# Patient Record
Sex: FEMALE | Race: WHITE | Employment: PART TIME | URBAN - METROPOLITAN AREA
[De-identification: names, ages, dates, MRNs, and addresses within clinical notes are randomized per-mention and may not be internally consistent; named-entity substitution may affect disease eponyms.]

---

## 2017-07-19 ENCOUNTER — HOSPITAL ENCOUNTER (OUTPATIENT)
Dept: WOMENS IMAGING | Age: 57
Discharge: HOME | End: 2017-07-19
Payer: MEDICAID

## 2017-07-19 DIAGNOSIS — Z13.9 SCREENING: ICD-10-CM

## 2017-07-19 PROCEDURE — 77063 BREAST TOMOSYNTHESIS BI: CPT

## 2018-12-03 ENCOUNTER — HOSPITAL ENCOUNTER (OUTPATIENT)
Dept: WOMENS IMAGING | Age: 58
Discharge: HOME | End: 2018-12-05
Payer: MEDICAID

## 2018-12-03 DIAGNOSIS — Z12.39 ENCOUNTER FOR SCREENING BREAST EXAMINATION: ICD-10-CM

## 2018-12-03 PROCEDURE — G0279 TOMOSYNTHESIS, MAMMO: HCPCS

## 2021-06-23 ENCOUNTER — HOSPITAL ENCOUNTER (OUTPATIENT)
Dept: WOMENS IMAGING | Age: 61
Discharge: HOME | End: 2021-06-25
Payer: MEDICAID

## 2021-06-23 DIAGNOSIS — Z12.31 ENCOUNTER FOR SCREENING MAMMOGRAM FOR BREAST CANCER: ICD-10-CM

## 2021-06-23 DIAGNOSIS — Z12.31 SCREENING MAMMOGRAM FOR HIGH-RISK PATIENT: ICD-10-CM

## 2021-06-23 PROCEDURE — 77063 BREAST TOMOSYNTHESIS BI: CPT

## 2024-05-01 ENCOUNTER — HOSPITAL ENCOUNTER (OUTPATIENT)
Dept: RADIOLOGY | Facility: CLINIC | Age: 64
Discharge: HOME | End: 2024-05-01
Payer: MEDICAID

## 2024-05-01 ENCOUNTER — OFFICE VISIT (OUTPATIENT)
Dept: ORTHOPEDIC SURGERY | Facility: CLINIC | Age: 64
End: 2024-05-01
Payer: MEDICAID

## 2024-05-01 DIAGNOSIS — M16.12 PRIMARY OSTEOARTHRITIS OF LEFT HIP: ICD-10-CM

## 2024-05-01 DIAGNOSIS — M17.11 PRIMARY OSTEOARTHRITIS OF RIGHT KNEE: ICD-10-CM

## 2024-05-01 DIAGNOSIS — M17.11 PRIMARY OSTEOARTHRITIS OF RIGHT KNEE: Primary | ICD-10-CM

## 2024-05-01 PROCEDURE — 73564 X-RAY EXAM KNEE 4 OR MORE: CPT | Mod: RT

## 2024-05-01 PROCEDURE — 73564 X-RAY EXAM KNEE 4 OR MORE: CPT | Mod: RIGHT SIDE | Performed by: ORTHOPAEDIC SURGERY

## 2024-05-01 PROCEDURE — 1036F TOBACCO NON-USER: CPT | Performed by: ORTHOPAEDIC SURGERY

## 2024-05-01 PROCEDURE — 73502 X-RAY EXAM HIP UNI 2-3 VIEWS: CPT | Mod: LT

## 2024-05-01 PROCEDURE — 73502 X-RAY EXAM HIP UNI 2-3 VIEWS: CPT | Mod: LEFT SIDE | Performed by: ORTHOPAEDIC SURGERY

## 2024-05-01 PROCEDURE — 99214 OFFICE O/P EST MOD 30 MIN: CPT | Performed by: ORTHOPAEDIC SURGERY

## 2024-05-01 PROCEDURE — 99213 OFFICE O/P EST LOW 20 MIN: CPT | Performed by: ORTHOPAEDIC SURGERY

## 2024-05-01 RX ORDER — CETIRIZINE HYDROCHLORIDE 10 MG/1
10 TABLET ORAL DAILY
COMMUNITY
Start: 2023-06-12

## 2024-05-01 RX ORDER — LISINOPRIL 10 MG/1
10 TABLET ORAL DAILY
COMMUNITY
Start: 2024-04-27

## 2024-05-01 RX ORDER — ATORVASTATIN CALCIUM 10 MG/1
10 TABLET, FILM COATED ORAL DAILY
COMMUNITY
Start: 2024-02-27

## 2024-05-01 RX ORDER — METOPROLOL TARTRATE 50 MG/1
50 TABLET ORAL 2 TIMES DAILY
COMMUNITY
Start: 2024-04-27

## 2024-05-01 NOTE — PROGRESS NOTES
Subjective    Patient ID: Grecia    Chief Complaint:   Chief Complaint   Patient presents with    Left Hip - Osteoarthritis     Xrays today    Right Knee - Osteoarthritis     Xrays toaday     History of present illness    63-year-old female presented to clinic today for follow-up evaluation left hip osteoarthritis and right knee osteoarthritis.  She has had chronic pain in both of these joints.  These are getting worse.  She had a period where she was uninsured and could not seek treatment.  She has new insurance today and is here for evaluation and further treatment.  She had previous Euflexxa injection of the right knee the last coming in June 2023 which gave at least 6 months of relief for her.  She is having increased left groin pain as well.  Difficulty with weightbearing activity.  Her work duties include extended weightbearing activity and limited sitting as she is a .  She has mild tingling and numbness down the right lower extremity not so much on the left.  No instability reported.      Past medical , Surgical, Family and social history reviewed.      Physical exam  General: No acute distress and breathing comfortably.  Patient is pleasant and cooperative with the examination.    Extremity  Right knee is neurovascular intact.  The affected knee was examined and inspected and was tender to touch along the medial aspect.  The patient has catching/locking and occasional mechanical symptoms.  The skin was intact without breakdown or open wounds.  There was a mild Mellissa exam seen without evidence of instability in the collateral ligaments or in the anterior posterior plane.  There was a negative Lachman's test, pivot shift test, and posterior drawer sign.  There was no foot drop, numbness or tingling.  Sensation, reflexes, and pulses in the foot and ankle were present.  There was an effusion but range of motion was good and straight leg raise testing was normal.  Knee range of motion was 0 degrees of  extension to 120 degrees of flexion.  The patient had the ability to bear weight but with discomfort.  The patient's gait was antalgic secondary to the discomfort.    Left hip is neurovascular intact.  The affected hip was examined and inspected and was tender to touch along the groin and lateral bursal area.  The hip joint occasionally displayed catching, locking, and mechanical symptoms.  The skin was intact without breakdown or open wounds.  There was some mild crepitus seen with hip internal and external range of motion without evidence of instability.  Inspection of the low back showed normal curvature and integrity of the skin.  Strength and stability of the low back muscles and ligaments were within normal limits.  There was a negative straight leg raise test with no foot drop, numbness, or tingling in both lower extremities.  Sensation, reflexes, and pulses in the foot and ankle are preserved.  There was no obvious effusion.  Range of motion showed flexion and internal and external rotation were all limited with pain.  The patient had the ability to bear weight, but with discomfort.  The patient's gait was antalgic secondary to the discomfort.    Diagnostics  Please see dictated x-ray report for right knee and left hip  No results found.     Procedure  [ none]    Assessment  Severe left hip osteoarthritis  Severe right knee osteoarthritis    Treatment plan  1.  At this time we a long discussion regards to continued conservative treatment options versus surgical intervention including a left total hip replacement since her left hip seems to be the most severe the 2 joints.  2.  She wants to continue with conservative treatment as of now.  Since she has had success with viscosupplementation we will go ahead and submit for approval for Euflexxa injections right knee.  We will also get her set up for a fluoroscopy guided steroid injection in the left hip.  She will continue with weightbearing activity as  tolerated.  Oral analgesics are recommended.  3.  She will follow-up with us after we get approval for these Euflexxa injections.  For complete plan and/or surgical details, please refer to Dr. Mancia's portion of the split/shared dictation.  4.  All of the patient's questions were answered.    In conclusion, this patient has osteoarthritis of the knee which is symptomatic.  This is causing significant morning stiffness lasting over an hour.  The patient has popping clicking and grinding in the knee with range of motion that is decreased and gets worse with prolonged standing or going up and down stairs.  This affects functional activities and activities of daily living.  There is radiographic evidence of osteoarthritis with joint space narrowing and marginal osteophyte formation.  This patient has also failed nonpharmacologic treatment for osteoarthritis including attempts at weight loss, and a home exercise program with or without physical therapy.  The patient has also failed pharmacologic treatments for osteoarthritis including over-the-counter analgesics, anti-inflammatory medication as well as injectable treatments.  For these reasons I feel the patient is a candidate for Visco supplementation injections of the knee.    Orders Placed This Encounter    XR hip left with pelvis when performed 2 or 3 views    XR knee right 4+ views       This note was prepared using voice recognition software.  The details of this note are correct and have been reviewed, and corrected to the best of my ability.  Some grammatical areas may persist related to the Dragon software    Neymar Braden PA-C, [facility]  [facility] [place]    [phone]    In a face-to-face encounter performed today, I Steven Mancia MD performed a history and physical examination, discussed pertinent diagnostic studies if indicated, and discussed diagnosis and management strategies with both the patient and the midlevel  provider.  I reviewed the midlevel's note and agree with the documented findings and plan of care.  Greater than 50% of the evaluation and treatment decision was performed by the physician myself during today's visit.    63-year-old female well-known to me presents for evaluation of her right knee and left hip she is struggling with arthritis in both joints she has had conservative treatment in the past she had gel injection back in June 2023 and that seemed to help significantly with her knee she has had cortisone in her left hip which is also helped her she denies any new injury she had a gap in insurance so she was not unable to follow-up.  Symptoms are getting progressively worse she has not had any injury hip has pain with internal extra rotation flexion abduction external Tatian is positive knee has crepitus with range of motion tenderness of both the medial and lateral joint space.  X-rays are reviewed today as well.  Plan is to go ahead and set her up for a fluoro-guided steroid injection of the left hip and management for authorization for viscosupplementation in her right knee.      Patient has severe impairment related to her presenting condition.  It is significantly impairing bodily function.  We did discuss surgical and nonsurgical options.    This note was prepared using voice recognition software.  The details of this note are correct and have been reviewed, and corrected to the best of my ability.  Some grammatical areas may persist related to the Dragon software    Steven Mancia MD  Senior Attending Physician  [facility]    [phone]

## 2024-06-04 ENCOUNTER — OFFICE VISIT (OUTPATIENT)
Dept: ORTHOPEDIC SURGERY | Facility: CLINIC | Age: 64
End: 2024-06-04
Payer: MEDICAID

## 2024-06-04 DIAGNOSIS — M16.12 PRIMARY OSTEOARTHRITIS OF LEFT HIP: Primary | ICD-10-CM

## 2024-06-04 PROCEDURE — 99214 OFFICE O/P EST MOD 30 MIN: CPT | Performed by: ORTHOPAEDIC SURGERY

## 2024-06-04 PROCEDURE — 1036F TOBACCO NON-USER: CPT | Performed by: ORTHOPAEDIC SURGERY

## 2024-06-04 NOTE — PROGRESS NOTES
History of present illness    63-year-old female chronic left-sided hip pain has severe hip arthritis at the time of her last visit she was considering a cortisone injection intra-articular however when she spoke to the radiologist she was told by the radiologist that it would be 6 months before any hip replacement surgery so she opted not to proceed with the injection the hip pain is getting progressively worse she is having severe impairment of her bodily function related to her left hip arthritis.      Past medical , Surgical, Family and social history reviewed.      Physical exam  General: No acute distress and breathing comfortably.  Patient is pleasant and cooperative with the examination.    Extremity  The affected hip was examined and inspected and was tender to touch along the groin and lateral bursal area.  The hip joint occasionally displayed catching, locking, and mechanical symptoms.  The skin was intact without breakdown or open wounds.  There was some mild crepitus seen with hip internal and external range of motion without evidence of instability.  Inspection of the low back showed normal curvature and integrity of the skin.  Strength and stability of the low back muscles and ligaments were within normal limits.  There was a negative straight leg raise test with no foot drop, numbness, or tingling in both lower extremities.  Sensation, reflexes, and pulses in the foot and ankle are preserved.  There was no obvious effusion.  Range of motion showed flexion and internal and external rotation were all limited with pain.  The patient had the ability to bear weight, but with discomfort.  The patient's gait was antalgic secondary to the discomfort.    Diagnostics      No results found.     Procedure  [ none]    Assessment  Severe left hip arthritis    Treatment plan  1.  The natural history of the condition and its associated treatment alternatives including surgical and nonsurgical  options were discussed with the patient at length.  2.  Patient has failed all forms of conservative treatment.  The joint pain is significantly affecting quality of life and activities of daily living.    The patient has pain in the joint that is increased with activity and weightbearing, and walks with an antalgic gait.  The pain is interfering with activities of daily living.  Patient has limited range of motion and pain with passive range of motion.  X-rays demonstrate joint space narrowing, subchondral sclerosis and osteophyte formation.  Symptoms are not improving with medication, physical therapy or supportive device for a period of at least 3 months.  Weight loss and smoking cessation have been discussed with the patient.  Patient advised on when to cease smoking 6-8 weeks before the procedure.      Patient would like to go and schedule joint replacement surgery.  The procedure its risks, benefits, and treatment alternatives were discussed at length and patient would like to proceed.  Patient is going to schedule the procedure at their earliest convenience and see me back for a preop visit just prior.  Preadmission testing, medical checkup, and insurance authorization will be performed in the meantime.  Patient understands a joint replacement surgery is a last resort, although a very successful operation results are not guaranteed.  3.  The procedures risk benefits treatment alternatives and postoperative course were discussed with her she understands and would like to proceed with hip replacement surgery I did explain that cortisone injections really hard 3 months afterwards but she still like to go and get the hip replacement scheduled soon as possible all her question where today  4.  All of the patient's questions were answered.    No orders of the defined types were placed in this encounter.      This note was prepared using voice recognition software.  The details of this note are correct and have been  reviewed, and corrected to the best of my ability.  Some grammatical areas may persist related to the Dragon software    Steven Mancia MD  Senior Attending Physician  [facility]  Orthopedic [place]    [phone]

## 2024-07-08 ENCOUNTER — TRANSCRIBE ORDERS (OUTPATIENT)
Dept: ORTHOPEDIC SURGERY | Facility: CLINIC | Age: 64
End: 2024-07-08
Payer: MEDICAID

## 2024-07-08 DIAGNOSIS — M16.12 UNILATERAL PRIMARY OSTEOARTHRITIS, LEFT HIP: Primary | ICD-10-CM

## 2024-07-09 PROBLEM — M16.12 UNILATERAL PRIMARY OSTEOARTHRITIS, LEFT HIP: Status: ACTIVE | Noted: 2024-07-08

## 2024-07-29 ENCOUNTER — HOSPITAL ENCOUNTER (OUTPATIENT)
Dept: RADIOLOGY | Facility: HOSPITAL | Age: 64
Discharge: HOME | End: 2024-07-29
Payer: MEDICAID

## 2024-07-29 ENCOUNTER — HOSPITAL ENCOUNTER (OUTPATIENT)
Dept: CARDIOLOGY | Facility: HOSPITAL | Age: 64
Discharge: HOME | End: 2024-07-29
Payer: MEDICAID

## 2024-07-29 ENCOUNTER — PRE-ADMISSION TESTING (OUTPATIENT)
Dept: PREADMISSION TESTING | Facility: HOSPITAL | Age: 64
End: 2024-07-29
Payer: MEDICAID

## 2024-07-29 VITALS
OXYGEN SATURATION: 96 % | WEIGHT: 129 LBS | DIASTOLIC BLOOD PRESSURE: 78 MMHG | BODY MASS INDEX: 21.49 KG/M2 | HEIGHT: 65 IN | HEART RATE: 62 BPM | RESPIRATION RATE: 18 BRPM | SYSTOLIC BLOOD PRESSURE: 130 MMHG

## 2024-07-29 DIAGNOSIS — Z00.01 ENCOUNTER FOR GENERAL ADULT MEDICAL EXAMINATION WITH ABNORMAL FINDINGS: ICD-10-CM

## 2024-07-29 DIAGNOSIS — M16.12 UNILATERAL PRIMARY OSTEOARTHRITIS, LEFT HIP: Primary | ICD-10-CM

## 2024-07-29 DIAGNOSIS — M16.12 UNILATERAL PRIMARY OSTEOARTHRITIS, LEFT HIP: ICD-10-CM

## 2024-07-29 LAB
ALBUMIN SERPL BCP-MCNC: 4.1 G/DL (ref 3.4–5)
ALP SERPL-CCNC: 67 U/L (ref 33–136)
ALT SERPL W P-5'-P-CCNC: 13 U/L (ref 7–45)
ANION GAP SERPL CALC-SCNC: 9 MMOL/L (ref 10–20)
APTT PPP: 33 SECONDS (ref 27–38)
AST SERPL W P-5'-P-CCNC: 22 U/L (ref 9–39)
BASOPHILS # BLD AUTO: 0.05 X10*3/UL (ref 0–0.1)
BASOPHILS NFR BLD AUTO: 0.6 %
BILIRUB SERPL-MCNC: 0.4 MG/DL (ref 0–1.2)
BUN SERPL-MCNC: 16 MG/DL (ref 6–23)
CALCIUM SERPL-MCNC: 9.4 MG/DL (ref 8.6–10.3)
CHLORIDE SERPL-SCNC: 104 MMOL/L (ref 98–107)
CO2 SERPL-SCNC: 30 MMOL/L (ref 21–32)
CREAT SERPL-MCNC: 0.74 MG/DL (ref 0.5–1.05)
EGFRCR SERPLBLD CKD-EPI 2021: >90 ML/MIN/1.73M*2
EOSINOPHIL # BLD AUTO: 0.35 X10*3/UL (ref 0–0.7)
EOSINOPHIL NFR BLD AUTO: 4.3 %
ERYTHROCYTE [DISTWIDTH] IN BLOOD BY AUTOMATED COUNT: 14.6 % (ref 11.5–14.5)
EST. AVERAGE GLUCOSE BLD GHB EST-MCNC: 111 MG/DL
GLUCOSE SERPL-MCNC: 97 MG/DL (ref 74–99)
HBA1C MFR BLD: 5.5 %
HCT VFR BLD AUTO: 39.9 % (ref 36–46)
HGB BLD-MCNC: 13.4 G/DL (ref 12–16)
IMM GRANULOCYTES # BLD AUTO: 0.01 X10*3/UL (ref 0–0.7)
IMM GRANULOCYTES NFR BLD AUTO: 0.1 % (ref 0–0.9)
INR PPP: 0.9 (ref 0.9–1.1)
LYMPHOCYTES # BLD AUTO: 2.76 X10*3/UL (ref 1.2–4.8)
LYMPHOCYTES NFR BLD AUTO: 34.2 %
MAGNESIUM SERPL-MCNC: 1.58 MG/DL (ref 1.6–2.4)
MCH RBC QN AUTO: 32.1 PG (ref 26–34)
MCHC RBC AUTO-ENTMCNC: 33.6 G/DL (ref 32–36)
MCV RBC AUTO: 96 FL (ref 80–100)
MONOCYTES # BLD AUTO: 0.62 X10*3/UL (ref 0.1–1)
MONOCYTES NFR BLD AUTO: 7.7 %
NEUTROPHILS # BLD AUTO: 4.29 X10*3/UL (ref 1.2–7.7)
NEUTROPHILS NFR BLD AUTO: 53.1 %
NRBC BLD-RTO: 0 /100 WBCS (ref 0–0)
PLATELET # BLD AUTO: 250 X10*3/UL (ref 150–450)
POTASSIUM SERPL-SCNC: 4.3 MMOL/L (ref 3.5–5.3)
PROT SERPL-MCNC: 6.5 G/DL (ref 6.4–8.2)
PROTHROMBIN TIME: 10.1 SECONDS (ref 9.8–12.8)
RBC # BLD AUTO: 4.18 X10*6/UL (ref 4–5.2)
SODIUM SERPL-SCNC: 139 MMOL/L (ref 136–145)
WBC # BLD AUTO: 8.1 X10*3/UL (ref 4.4–11.3)

## 2024-07-29 PROCEDURE — 73700 CT LOWER EXTREMITY W/O DYE: CPT | Mod: LT

## 2024-07-29 PROCEDURE — 71046 X-RAY EXAM CHEST 2 VIEWS: CPT | Performed by: RADIOLOGY

## 2024-07-29 PROCEDURE — 93010 ELECTROCARDIOGRAM REPORT: CPT | Performed by: INTERNAL MEDICINE

## 2024-07-29 PROCEDURE — 85025 COMPLETE CBC W/AUTO DIFF WBC: CPT

## 2024-07-29 PROCEDURE — 83036 HEMOGLOBIN GLYCOSYLATED A1C: CPT | Mod: ELYLAB

## 2024-07-29 PROCEDURE — 87081 CULTURE SCREEN ONLY: CPT | Mod: ELYLAB

## 2024-07-29 PROCEDURE — 84075 ASSAY ALKALINE PHOSPHATASE: CPT

## 2024-07-29 PROCEDURE — 71046 X-RAY EXAM CHEST 2 VIEWS: CPT

## 2024-07-29 PROCEDURE — 36415 COLL VENOUS BLD VENIPUNCTURE: CPT

## 2024-07-29 PROCEDURE — 93005 ELECTROCARDIOGRAM TRACING: CPT

## 2024-07-29 PROCEDURE — 85730 THROMBOPLASTIN TIME PARTIAL: CPT

## 2024-07-29 PROCEDURE — 85610 PROTHROMBIN TIME: CPT

## 2024-07-29 PROCEDURE — 83735 ASSAY OF MAGNESIUM: CPT

## 2024-07-29 RX ORDER — BISMUTH SUBSALICYLATE 262 MG
1 TABLET,CHEWABLE ORAL DAILY
COMMUNITY

## 2024-07-29 NOTE — PREPROCEDURE INSTRUCTIONS
Patient and nurse discussed pre-operative instructions of the location of procedure, NPO status, home medications, and what to aspect after procedure.  Patient is aware to not smoke nicotine products, drink alcohol, or do any drugs within 24hrs of procedure.  Not to bring any valuables and to not wear any metal, jewelry, piercing's or beauty products for surgery.  Patient received the  instructional handout on how to perform the CHG wash and oral rinse the night before and the morning of surgery.  Informed about the call the business day prior to procedure that will be give the exact time of arrival on the day of surgery, between 2PM-4PM.  Any questions call the surgeons office, and any questions about Pre-Admission Testing call [phone]

## 2024-07-31 LAB
ATRIAL RATE: 51 BPM
P AXIS: 49 DEGREES
P OFFSET: 200 MS
P ONSET: 151 MS
PR INTERVAL: 140 MS
Q ONSET: 221 MS
QRS COUNT: 9 BEATS
QRS DURATION: 78 MS
QT INTERVAL: 388 MS
QTC CALCULATION(BAZETT): 357 MS
QTC FREDERICIA: 367 MS
R AXIS: 63 DEGREES
STAPHYLOCOCCUS SPEC CULT: NORMAL
T AXIS: 70 DEGREES
T OFFSET: 415 MS
VENTRICULAR RATE: 51 BPM

## 2024-08-02 NOTE — PROGRESS NOTES
Physical Therapy  Physical Therapy Pre-Op Evaluation    Patient Name: Grecia Stephenson  MRN: [id number]  Today's Date: 8/6/2024  Time Calculation  Start Time: 0246  Stop Time: 0306  Time Calculation (min): 20 min  PT Evaluation Time Entry  PT Evaluation (Low) Time Entry: 20                    Insurance:  [employer] [id number] THEN AUTH REQ  COPAY 0 DED 0 COVERAGE 100 OOP 0   1 of 30    General:  Reason for visit: L ZINA Pre-op 8/12/24      Precautions: Per MR       Medical History Form: Reviewed (scanned into chart)    Subjective:   63-year-old female chronic left-sided hip pain has severe hip arthritis at the time of her last visit she was considering a cortisone injection intra-articular however when she spoke to the radiologist she was told by the radiologist that it would be 6 months before any hip replacement surgery so she opted not to proceed with the injection the hip pain is getting progressively worse she is having severe impairment of her bodily function related to her left hip arthritis.   -Encounter note 6/4/24    Chief Complaint: Patient presents to clinic L hip pain      Current Condition:   Same    Pain:         Patients Living Environment: Reviewed and no concern      Red Flags: Do you have any of the following? No  Fever/chills, unexplained weight changes, dizziness/fainting, unexplained change in bowel or bladder functions, unexplained malaise or muscle weakness, night pain/sweats, numbness or tingling    Objective:    L HIP     Hip AROM  F 100  E 5  AB 20  AD 0  IR 5  ER 5          L hipMMT 0/5   F 3+  E 4-  AB 3+  AD 4  IR 3  ER 3              Posture: Flexed trunk w/ listing    Lower Extremity Functional Movements  Transfers: Ind  Gait: Ind -w/ antalgic pattern      Outcome Measures: to be taken at Re-Eval    EDUCATION: Pt educated in Post-operative Exs, surgery risks, proper gait w/ device (if applicable), signs of infection, when to call doctor, precautions and issued hand  booklet.    Goals: To be set at Re-Eval based on physical and functional status      Assessment:  Pt understands post-operative materials.    Plan:  Have surgery and follow up with OP PT when appropriate    Giorgio Goodwin, PT, PT

## 2024-08-06 ENCOUNTER — EVALUATION (OUTPATIENT)
Dept: PHYSICAL THERAPY | Facility: CLINIC | Age: 64
End: 2024-08-06
Payer: MEDICAID

## 2024-08-06 ENCOUNTER — OFFICE VISIT (OUTPATIENT)
Dept: ORTHOPEDIC SURGERY | Facility: CLINIC | Age: 64
End: 2024-08-06
Payer: MEDICAID

## 2024-08-06 DIAGNOSIS — M16.12 PRIMARY OSTEOARTHRITIS OF LEFT HIP: Primary | ICD-10-CM

## 2024-08-06 DIAGNOSIS — M25.552 PAIN OF LEFT HIP: Primary | ICD-10-CM

## 2024-08-06 PROCEDURE — 97161 PT EVAL LOW COMPLEX 20 MIN: CPT | Mod: GP | Performed by: PHYSICAL THERAPIST

## 2024-08-06 PROCEDURE — 99211 OFF/OP EST MAY X REQ PHY/QHP: CPT | Performed by: ORTHOPAEDIC SURGERY

## 2024-08-06 NOTE — PROGRESS NOTES
Subjective    Patient ID: Grecia    Chief Complaint:   Chief Complaint   Patient presents with    Left Hip - Pre-op Exam     ZINA 8/12/2024     This patient has pain in the hip that is increased with activity and weightbearing, the patient walks with an antalgic gait at this time.  The pain is interfering with activities of daily living.  The patient has limited range of motion of the hip and pain with passive range of motion.  This x-ray demonstrates joint space narrowing, subchondral sclerosis, and osteophyte formation.  The patient has failed to respond to conservative treatment with medication therapy and supportive devices for period of at least 3 months.     This is the preop visit to discuss the risks and benefits of the total hip replacement surgery.  These risks were fully explained to the patient.  With this, and as any surgery, infection is a risk.  The risk for infection is usually between 1 and 2%.  This risk is even higher in diabetics, persons with rheumatoid arthritis, patients with previous surgery, patients on oral steroid medication, and obesity.  All of these issues were properly discussed.  We always assure all sterile techniques will be followed during the procedure.  Patient is also need to be on antibiotics for the next 2 years for any minor surgical procedure, even dental work.  For high risk patients this will be for lifetime.  Severe infections may require removal of the prosthesis.     It was also explained to the patient that there will be some blood loss during the procedure.  Transfusions although rare will only be given when absolutely medically necessary.     Pulmonary embolism and blood clots were also discussed with the patient.  We discussed that these can be fatal complications of the procedure.  Is explained to the patient that the use of thromboembolic stockings, foot pumps, incentive spirometer, early mobility, and the use of blood thinners for a period of time is the  standard of care.     Dislocations are discussed with the patient.  It is explained that the highest risk for dislocating the hip happens during the first 3 months after surgery.  These risks tend to decrease with time.  This risk is higher and revisions.  It is explained to the patient that hip precautions are very important and that these will be carried out throughout the lifetime with her prosthesis.  Intraoperatively, we will adjust the length of the leg to tighten the joint to help prevent dislocation.  This leg lengthening to stabilize the joint is usually no more than 1/4 inch but may be more or less to improve stability.     Loosening and wear of the prosthesis is also discussed.  The prosthesis normally lasts between 12 and 15 years on the average.  Revisions may be more complicated.     Fractures, though rare, they also occur intraoperatively.  These fractures may occur in the pelvis or the femur.  There may be nerve or arterial injuries as well and these are discussed in detail.  Lastly the benefits of spinal anesthesia were explained to the patient.     All of the patient's questions and concerns were answered in detail.     This note was prepared using voice recognition software.  The details of this note are correct and have been reviewed, and corrected to the best of my ability.  Some grammatical areas may persist related to the Dragon software     Neymar Braden PA-C     [phone]

## 2024-08-06 NOTE — H&P (VIEW-ONLY)
Subjective    Patient ID: Grecia    Chief Complaint:   Chief Complaint   Patient presents with    Left Hip - Pre-op Exam     ZINA 8/12/2024     This patient has pain in the hip that is increased with activity and weightbearing, the patient walks with an antalgic gait at this time.  The pain is interfering with activities of daily living.  The patient has limited range of motion of the hip and pain with passive range of motion.  This x-ray demonstrates joint space narrowing, subchondral sclerosis, and osteophyte formation.  The patient has failed to respond to conservative treatment with medication therapy and supportive devices for period of at least 3 months.     This is the preop visit to discuss the risks and benefits of the total hip replacement surgery.  These risks were fully explained to the patient.  With this, and as any surgery, infection is a risk.  The risk for infection is usually between 1 and 2%.  This risk is even higher in diabetics, persons with rheumatoid arthritis, patients with previous surgery, patients on oral steroid medication, and obesity.  All of these issues were properly discussed.  We always assure all sterile techniques will be followed during the procedure.  Patient is also need to be on antibiotics for the next 2 years for any minor surgical procedure, even dental work.  For high risk patients this will be for lifetime.  Severe infections may require removal of the prosthesis.     It was also explained to the patient that there will be some blood loss during the procedure.  Transfusions although rare will only be given when absolutely medically necessary.     Pulmonary embolism and blood clots were also discussed with the patient.  We discussed that these can be fatal complications of the procedure.  Is explained to the patient that the use of thromboembolic stockings, foot pumps, incentive spirometer, early mobility, and the use of blood thinners for a period of time is the  standard of care.     Dislocations are discussed with the patient.  It is explained that the highest risk for dislocating the hip happens during the first 3 months after surgery.  These risks tend to decrease with time.  This risk is higher and revisions.  It is explained to the patient that hip precautions are very important and that these will be carried out throughout the lifetime with her prosthesis.  Intraoperatively, we will adjust the length of the leg to tighten the joint to help prevent dislocation.  This leg lengthening to stabilize the joint is usually no more than 1/4 inch but may be more or less to improve stability.     Loosening and wear of the prosthesis is also discussed.  The prosthesis normally lasts between 12 and 15 years on the average.  Revisions may be more complicated.     Fractures, though rare, they also occur intraoperatively.  These fractures may occur in the pelvis or the femur.  There may be nerve or arterial injuries as well and these are discussed in detail.  Lastly the benefits of spinal anesthesia were explained to the patient.     All of the patient's questions and concerns were answered in detail.     This note was prepared using voice recognition software.  The details of this note are correct and have been reviewed, and corrected to the best of my ability.  Some grammatical areas may persist related to the Dragon software     Neymar Braden PA-C     [phone]

## 2024-08-09 DIAGNOSIS — Z01.818 PRE-OPERATIVE CLEARANCE: Primary | ICD-10-CM

## 2024-08-09 PROCEDURE — RXMED WILLOW AMBULATORY MEDICATION CHARGE

## 2024-08-09 RX ORDER — CHLORHEXIDINE GLUCONATE ORAL RINSE 1.2 MG/ML
15 SOLUTION DENTAL DAILY
Qty: 30 ML | Refills: 0 | Status: SHIPPED | OUTPATIENT
Start: 2024-08-09 | End: 2024-08-11

## 2024-08-12 ENCOUNTER — ANESTHESIA EVENT (OUTPATIENT)
Dept: OPERATING ROOM | Facility: HOSPITAL | Age: 64
End: 2024-08-12
Payer: MEDICAID

## 2024-08-12 ENCOUNTER — HOSPITAL ENCOUNTER (OUTPATIENT)
Facility: HOSPITAL | Age: 64
Discharge: HOME | End: 2024-08-13
Attending: ORTHOPAEDIC SURGERY | Admitting: ORTHOPAEDIC SURGERY
Payer: MEDICAID

## 2024-08-12 ENCOUNTER — APPOINTMENT (OUTPATIENT)
Dept: RADIOLOGY | Facility: HOSPITAL | Age: 64
End: 2024-08-12
Payer: MEDICAID

## 2024-08-12 ENCOUNTER — ANESTHESIA (OUTPATIENT)
Dept: OPERATING ROOM | Facility: HOSPITAL | Age: 64
End: 2024-08-12
Payer: MEDICAID

## 2024-08-12 ENCOUNTER — HOME HEALTH ADMISSION (OUTPATIENT)
Dept: HOME HEALTH SERVICES | Facility: HOME HEALTH | Age: 64
End: 2024-08-12
Payer: MEDICAID

## 2024-08-12 DIAGNOSIS — M16.12 UNILATERAL PRIMARY OSTEOARTHRITIS, LEFT HIP: Primary | ICD-10-CM

## 2024-08-12 DIAGNOSIS — Z96.642 STATUS POST LEFT HIP REPLACEMENT: ICD-10-CM

## 2024-08-12 PROCEDURE — 97110 THERAPEUTIC EXERCISES: CPT | Mod: GP | Performed by: PHYSICAL THERAPIST

## 2024-08-12 PROCEDURE — 20985 CPTR-ASST DIR MS PX: CPT | Performed by: ORTHOPAEDIC SURGERY

## 2024-08-12 PROCEDURE — 97161 PT EVAL LOW COMPLEX 20 MIN: CPT | Mod: GP | Performed by: PHYSICAL THERAPIST

## 2024-08-12 PROCEDURE — 3700000001 HC GENERAL ANESTHESIA TIME - INITIAL BASE CHARGE: Performed by: ORTHOPAEDIC SURGERY

## 2024-08-12 PROCEDURE — 27130 TOTAL HIP ARTHROPLASTY: CPT | Performed by: ORTHOPAEDIC SURGERY

## 2024-08-12 PROCEDURE — C1776 JOINT DEVICE (IMPLANTABLE): HCPCS | Performed by: ORTHOPAEDIC SURGERY

## 2024-08-12 PROCEDURE — 2500000004 HC RX 250 GENERAL PHARMACY W/ HCPCS (ALT 636 FOR OP/ED): Performed by: ORTHOPAEDIC SURGERY

## 2024-08-12 PROCEDURE — 2500000002 HC RX 250 W HCPCS SELF ADMINISTERED DRUGS (ALT 637 FOR MEDICARE OP, ALT 636 FOR OP/ED): Performed by: ORTHOPAEDIC SURGERY

## 2024-08-12 PROCEDURE — 2500000005 HC RX 250 GENERAL PHARMACY W/O HCPCS: Performed by: ORTHOPAEDIC SURGERY

## 2024-08-12 PROCEDURE — 2720000007 HC OR 272 NO HCPCS: Performed by: ORTHOPAEDIC SURGERY

## 2024-08-12 PROCEDURE — 73501 X-RAY EXAM HIP UNI 1 VIEW: CPT | Mod: LEFT SIDE | Performed by: RADIOLOGY

## 2024-08-12 PROCEDURE — 2780000003 HC OR 278 NO HCPCS: Performed by: ORTHOPAEDIC SURGERY

## 2024-08-12 PROCEDURE — 7100000001 HC RECOVERY ROOM TIME - INITIAL BASE CHARGE: Performed by: ORTHOPAEDIC SURGERY

## 2024-08-12 PROCEDURE — 7100000002 HC RECOVERY ROOM TIME - EACH INCREMENTAL 1 MINUTE: Performed by: ORTHOPAEDIC SURGERY

## 2024-08-12 PROCEDURE — 73501 X-RAY EXAM HIP UNI 1 VIEW: CPT | Mod: LT

## 2024-08-12 PROCEDURE — C1713 ANCHOR/SCREW BN/BN,TIS/BN: HCPCS | Performed by: ORTHOPAEDIC SURGERY

## 2024-08-12 PROCEDURE — 3700000002 HC GENERAL ANESTHESIA TIME - EACH INCREMENTAL 1 MINUTE: Performed by: ORTHOPAEDIC SURGERY

## 2024-08-12 PROCEDURE — 7100000011 HC EXTENDED STAY RECOVERY HOURLY - NURSING UNIT

## 2024-08-12 PROCEDURE — 3600000018 HC OR TIME - INITIAL BASE CHARGE - PROCEDURE LEVEL SIX: Performed by: ORTHOPAEDIC SURGERY

## 2024-08-12 PROCEDURE — 2500000001 HC RX 250 WO HCPCS SELF ADMINISTERED DRUGS (ALT 637 FOR MEDICARE OP): Performed by: NURSE PRACTITIONER

## 2024-08-12 PROCEDURE — 2500000001 HC RX 250 WO HCPCS SELF ADMINISTERED DRUGS (ALT 637 FOR MEDICARE OP): Performed by: ORTHOPAEDIC SURGERY

## 2024-08-12 PROCEDURE — 2500000004 HC RX 250 GENERAL PHARMACY W/ HCPCS (ALT 636 FOR OP/ED): Performed by: NURSE ANESTHETIST, CERTIFIED REGISTERED

## 2024-08-12 PROCEDURE — 3600000017 HC OR TIME - EACH INCREMENTAL 1 MINUTE - PROCEDURE LEVEL SIX: Performed by: ORTHOPAEDIC SURGERY

## 2024-08-12 PROCEDURE — 2500000004 HC RX 250 GENERAL PHARMACY W/ HCPCS (ALT 636 FOR OP/ED): Performed by: ANESTHESIOLOGY

## 2024-08-12 PROCEDURE — 2500000005 HC RX 250 GENERAL PHARMACY W/O HCPCS: Performed by: NURSE ANESTHETIST, CERTIFIED REGISTERED

## 2024-08-12 PROCEDURE — A6213 FOAM DRG >16<=48 SQ IN W/BDR: HCPCS | Performed by: ORTHOPAEDIC SURGERY

## 2024-08-12 DEVICE — INSERT, TRIDENT X3 POLYETHYLENE, 10 DEG, 32MM D: Type: IMPLANTABLE DEVICE | Site: HIP | Status: FUNCTIONAL

## 2024-08-12 DEVICE — TRIDENT II CLUSTERHOLE HA ACETABULAR SHELL 48D
Type: IMPLANTABLE DEVICE | Site: HIP | Status: FUNCTIONAL
Brand: TRIDENT II

## 2024-08-12 DEVICE — 127 DEGREE NECK ANGLE HIP STEM
Type: IMPLANTABLE DEVICE | Site: HIP | Status: FUNCTIONAL
Brand: ACCOLADE

## 2024-08-12 DEVICE — CERAMIC V40 FEMORAL HEAD
Type: IMPLANTABLE DEVICE | Site: HIP | Status: FUNCTIONAL
Brand: BIOLOX

## 2024-08-12 RX ORDER — BISACODYL 5 MG
10 TABLET, DELAYED RELEASE (ENTERIC COATED) ORAL DAILY PRN
Status: DISCONTINUED | OUTPATIENT
Start: 2024-08-12 | End: 2024-08-13 | Stop reason: HOSPADM

## 2024-08-12 RX ORDER — KETOROLAC TROMETHAMINE 30 MG/ML
15 INJECTION, SOLUTION INTRAMUSCULAR; INTRAVENOUS EVERY 6 HOURS
Status: COMPLETED | OUTPATIENT
Start: 2024-08-12 | End: 2024-08-13

## 2024-08-12 RX ORDER — CETIRIZINE HYDROCHLORIDE 10 MG/1
10 TABLET ORAL DAILY
Status: DISCONTINUED | OUTPATIENT
Start: 2024-08-12 | End: 2024-08-13 | Stop reason: HOSPADM

## 2024-08-12 RX ORDER — MORPHINE SULFATE 2 MG/ML
2 INJECTION, SOLUTION INTRAMUSCULAR; INTRAVENOUS EVERY 2 HOUR PRN
Status: DISCONTINUED | OUTPATIENT
Start: 2024-08-12 | End: 2024-08-13 | Stop reason: HOSPADM

## 2024-08-12 RX ORDER — SODIUM CHLORIDE, SODIUM LACTATE, POTASSIUM CHLORIDE, CALCIUM CHLORIDE 600; 310; 30; 20 MG/100ML; MG/100ML; MG/100ML; MG/100ML
100 INJECTION, SOLUTION INTRAVENOUS CONTINUOUS
Status: DISCONTINUED | OUTPATIENT
Start: 2024-08-12 | End: 2024-08-12 | Stop reason: HOSPADM

## 2024-08-12 RX ORDER — ACETAMINOPHEN 325 MG/1
650 TABLET ORAL EVERY 6 HOURS SCHEDULED
Status: DISCONTINUED | OUTPATIENT
Start: 2024-08-12 | End: 2024-08-13 | Stop reason: HOSPADM

## 2024-08-12 RX ORDER — ATORVASTATIN CALCIUM 10 MG/1
10 TABLET, FILM COATED ORAL DAILY
Status: DISCONTINUED | OUTPATIENT
Start: 2024-08-12 | End: 2024-08-13 | Stop reason: HOSPADM

## 2024-08-12 RX ORDER — MEPERIDINE HYDROCHLORIDE 25 MG/ML
12.5 INJECTION INTRAMUSCULAR; INTRAVENOUS; SUBCUTANEOUS EVERY 10 MIN PRN
Status: DISCONTINUED | OUTPATIENT
Start: 2024-08-12 | End: 2024-08-12 | Stop reason: HOSPADM

## 2024-08-12 RX ORDER — ONDANSETRON HYDROCHLORIDE 2 MG/ML
INJECTION, SOLUTION INTRAVENOUS AS NEEDED
Status: DISCONTINUED | OUTPATIENT
Start: 2024-08-12 | End: 2024-08-12

## 2024-08-12 RX ORDER — TRANEXAMIC ACID 650 MG/1
1950 TABLET ORAL ONCE
Status: COMPLETED | OUTPATIENT
Start: 2024-08-13 | End: 2024-08-13

## 2024-08-12 RX ORDER — FENTANYL CITRATE 50 UG/ML
25 INJECTION, SOLUTION INTRAMUSCULAR; INTRAVENOUS EVERY 5 MIN PRN
Status: DISCONTINUED | OUTPATIENT
Start: 2024-08-12 | End: 2024-08-12 | Stop reason: HOSPADM

## 2024-08-12 RX ORDER — GABAPENTIN 300 MG/1
600 CAPSULE ORAL ONCE
Status: COMPLETED | OUTPATIENT
Start: 2024-08-12 | End: 2024-08-12

## 2024-08-12 RX ORDER — ONDANSETRON HYDROCHLORIDE 2 MG/ML
4 INJECTION, SOLUTION INTRAVENOUS EVERY 8 HOURS PRN
Status: DISCONTINUED | OUTPATIENT
Start: 2024-08-12 | End: 2024-08-13 | Stop reason: HOSPADM

## 2024-08-12 RX ORDER — PHENYLEPHRINE HCL IN 0.9% NACL 1 MG/10 ML
SYRINGE (ML) INTRAVENOUS AS NEEDED
Status: DISCONTINUED | OUTPATIENT
Start: 2024-08-12 | End: 2024-08-12

## 2024-08-12 RX ORDER — OXYCODONE HYDROCHLORIDE 5 MG/1
5 TABLET ORAL EVERY 4 HOURS PRN
Status: DISCONTINUED | OUTPATIENT
Start: 2024-08-12 | End: 2024-08-13 | Stop reason: HOSPADM

## 2024-08-12 RX ORDER — SODIUM CHLORIDE, SODIUM LACTATE, POTASSIUM CHLORIDE, CALCIUM CHLORIDE 600; 310; 30; 20 MG/100ML; MG/100ML; MG/100ML; MG/100ML
50 INJECTION, SOLUTION INTRAVENOUS CONTINUOUS
Status: DISCONTINUED | OUTPATIENT
Start: 2024-08-12 | End: 2024-08-13 | Stop reason: HOSPADM

## 2024-08-12 RX ORDER — ONDANSETRON 4 MG/1
4 TABLET, ORALLY DISINTEGRATING ORAL EVERY 8 HOURS PRN
Status: DISCONTINUED | OUTPATIENT
Start: 2024-08-12 | End: 2024-08-13 | Stop reason: HOSPADM

## 2024-08-12 RX ORDER — PROPOFOL 10 MG/ML
INJECTION, EMULSION INTRAVENOUS CONTINUOUS PRN
Status: DISCONTINUED | OUTPATIENT
Start: 2024-08-12 | End: 2024-08-12

## 2024-08-12 RX ORDER — SODIUM CHLORIDE 0.9 G/100ML
IRRIGANT IRRIGATION AS NEEDED
Status: DISCONTINUED | OUTPATIENT
Start: 2024-08-12 | End: 2024-08-12 | Stop reason: HOSPADM

## 2024-08-12 RX ORDER — CEFAZOLIN SODIUM 2 G/100ML
2 INJECTION, SOLUTION INTRAVENOUS EVERY 8 HOURS
Status: COMPLETED | OUTPATIENT
Start: 2024-08-12 | End: 2024-08-13

## 2024-08-12 RX ORDER — ROPIVACAINE/EPI/CLONIDINE/KET 2.46-0.005
SYRINGE (ML) INJECTION AS NEEDED
Status: DISCONTINUED | OUTPATIENT
Start: 2024-08-12 | End: 2024-08-12 | Stop reason: HOSPADM

## 2024-08-12 RX ORDER — SODIUM CHLORIDE, SODIUM LACTATE, POTASSIUM CHLORIDE, CALCIUM CHLORIDE 600; 310; 30; 20 MG/100ML; MG/100ML; MG/100ML; MG/100ML
50 INJECTION, SOLUTION INTRAVENOUS CONTINUOUS
Status: ACTIVE | OUTPATIENT
Start: 2024-08-12 | End: 2024-08-13

## 2024-08-12 RX ORDER — ACETAMINOPHEN 325 MG/1
975 TABLET ORAL ONCE
Status: COMPLETED | OUTPATIENT
Start: 2024-08-12 | End: 2024-08-12

## 2024-08-12 RX ORDER — DIPHENHYDRAMINE HCL 25 MG
25 TABLET ORAL EVERY 6 HOURS PRN
Status: DISCONTINUED | OUTPATIENT
Start: 2024-08-12 | End: 2024-08-13 | Stop reason: HOSPADM

## 2024-08-12 RX ORDER — ASPIRIN 81 MG/1
81 TABLET ORAL 2 TIMES DAILY
Status: DISCONTINUED | OUTPATIENT
Start: 2024-08-12 | End: 2024-08-13 | Stop reason: HOSPADM

## 2024-08-12 RX ORDER — OXYCODONE HYDROCHLORIDE 5 MG/1
10 TABLET ORAL EVERY 4 HOURS PRN
Status: DISCONTINUED | OUTPATIENT
Start: 2024-08-12 | End: 2024-08-13 | Stop reason: HOSPADM

## 2024-08-12 RX ORDER — LIDOCAINE HYDROCHLORIDE 20 MG/ML
INJECTION, SOLUTION EPIDURAL; INFILTRATION; INTRACAUDAL; PERINEURAL AS NEEDED
Status: DISCONTINUED | OUTPATIENT
Start: 2024-08-12 | End: 2024-08-12

## 2024-08-12 RX ORDER — MIDAZOLAM HYDROCHLORIDE 1 MG/ML
INJECTION, SOLUTION INTRAMUSCULAR; INTRAVENOUS AS NEEDED
Status: DISCONTINUED | OUTPATIENT
Start: 2024-08-12 | End: 2024-08-12

## 2024-08-12 RX ORDER — CEFAZOLIN SODIUM 2 G/100ML
2 INJECTION, SOLUTION INTRAVENOUS ONCE
Status: COMPLETED | OUTPATIENT
Start: 2024-08-12 | End: 2024-08-12

## 2024-08-12 RX ORDER — CYCLOBENZAPRINE HCL 10 MG
10 TABLET ORAL 3 TIMES DAILY PRN
Status: DISCONTINUED | OUTPATIENT
Start: 2024-08-12 | End: 2024-08-13 | Stop reason: HOSPADM

## 2024-08-12 RX ORDER — PROCHLORPERAZINE MALEATE 5 MG
10 TABLET ORAL EVERY 6 HOURS PRN
Status: DISCONTINUED | OUTPATIENT
Start: 2024-08-12 | End: 2024-08-13 | Stop reason: HOSPADM

## 2024-08-12 RX ORDER — DOCUSATE SODIUM 100 MG/1
100 CAPSULE, LIQUID FILLED ORAL 2 TIMES DAILY
Status: DISCONTINUED | OUTPATIENT
Start: 2024-08-12 | End: 2024-08-13 | Stop reason: HOSPADM

## 2024-08-12 RX ORDER — CELECOXIB 200 MG/1
200 CAPSULE ORAL ONCE
Status: COMPLETED | OUTPATIENT
Start: 2024-08-12 | End: 2024-08-12

## 2024-08-12 RX ORDER — WATER 1 ML/ML
IRRIGANT IRRIGATION AS NEEDED
Status: DISCONTINUED | OUTPATIENT
Start: 2024-08-12 | End: 2024-08-12 | Stop reason: HOSPADM

## 2024-08-12 RX ORDER — PROCHLORPERAZINE EDISYLATE 5 MG/ML
10 INJECTION INTRAMUSCULAR; INTRAVENOUS EVERY 6 HOURS PRN
Status: DISCONTINUED | OUTPATIENT
Start: 2024-08-12 | End: 2024-08-13 | Stop reason: HOSPADM

## 2024-08-12 RX ORDER — BUPIVACAINE HYDROCHLORIDE 7.5 MG/ML
INJECTION, SOLUTION INTRASPINAL AS NEEDED
Status: DISCONTINUED | OUTPATIENT
Start: 2024-08-12 | End: 2024-08-12

## 2024-08-12 RX ORDER — PROCHLORPERAZINE 25 MG/1
25 SUPPOSITORY RECTAL EVERY 12 HOURS PRN
Status: DISCONTINUED | OUTPATIENT
Start: 2024-08-12 | End: 2024-08-13 | Stop reason: HOSPADM

## 2024-08-12 RX ORDER — TRANEXAMIC ACID 650 MG/1
1950 TABLET ORAL ONCE
Status: COMPLETED | OUTPATIENT
Start: 2024-08-12 | End: 2024-08-12

## 2024-08-12 RX ORDER — FENTANYL CITRATE 50 UG/ML
50 INJECTION, SOLUTION INTRAMUSCULAR; INTRAVENOUS EVERY 5 MIN PRN
Status: DISCONTINUED | OUTPATIENT
Start: 2024-08-12 | End: 2024-08-12 | Stop reason: HOSPADM

## 2024-08-12 RX ORDER — METOPROLOL TARTRATE 50 MG/1
50 TABLET ORAL 2 TIMES DAILY
Status: DISCONTINUED | OUTPATIENT
Start: 2024-08-12 | End: 2024-08-13 | Stop reason: HOSPADM

## 2024-08-12 SDOH — SOCIAL STABILITY: SOCIAL INSECURITY: HAS ANYONE EVER THREATENED TO HURT YOUR FAMILY OR YOUR PETS?: NO

## 2024-08-12 SDOH — SOCIAL STABILITY: SOCIAL INSECURITY: ARE THERE ANY APPARENT SIGNS OF INJURIES/BEHAVIORS THAT COULD BE RELATED TO ABUSE/NEGLECT?: NO

## 2024-08-12 SDOH — SOCIAL STABILITY: SOCIAL INSECURITY: ABUSE: ADULT

## 2024-08-12 SDOH — SOCIAL STABILITY: SOCIAL INSECURITY: DOES ANYONE TRY TO KEEP YOU FROM HAVING/CONTACTING OTHER FRIENDS OR DOING THINGS OUTSIDE YOUR HOME?: NO

## 2024-08-12 SDOH — SOCIAL STABILITY: SOCIAL INSECURITY: ARE YOU OR HAVE YOU BEEN THREATENED OR ABUSED PHYSICALLY, EMOTIONALLY, OR SEXUALLY BY ANYONE?: NO

## 2024-08-12 SDOH — SOCIAL STABILITY: SOCIAL INSECURITY: DO YOU FEEL ANYONE HAS EXPLOITED OR TAKEN ADVANTAGE OF YOU FINANCIALLY OR OF YOUR PERSONAL PROPERTY?: NO

## 2024-08-12 SDOH — SOCIAL STABILITY: SOCIAL INSECURITY: HAVE YOU HAD THOUGHTS OF HARMING ANYONE ELSE?: NO

## 2024-08-12 SDOH — SOCIAL STABILITY: SOCIAL INSECURITY: HAVE YOU HAD ANY THOUGHTS OF HARMING ANYONE ELSE?: NO

## 2024-08-12 SDOH — SOCIAL STABILITY: SOCIAL INSECURITY: WERE YOU ABLE TO COMPLETE ALL THE BEHAVIORAL HEALTH SCREENINGS?: YES

## 2024-08-12 SDOH — SOCIAL STABILITY: SOCIAL INSECURITY: DO YOU FEEL UNSAFE GOING BACK TO THE PLACE WHERE YOU ARE LIVING?: NO

## 2024-08-12 ASSESSMENT — PAIN - FUNCTIONAL ASSESSMENT
PAIN_FUNCTIONAL_ASSESSMENT: 0-10
PAIN_FUNCTIONAL_ASSESSMENT: VAS (VISUAL ANALOG SCALE)
PAIN_FUNCTIONAL_ASSESSMENT: 0-10
PAIN_FUNCTIONAL_ASSESSMENT: 0-10

## 2024-08-12 ASSESSMENT — COGNITIVE AND FUNCTIONAL STATUS - GENERAL
MOBILITY SCORE: 10
DRESSING REGULAR UPPER BODY CLOTHING: A LITTLE
MOVING FROM LYING ON BACK TO SITTING ON SIDE OF FLAT BED WITH BEDRAILS: A LITTLE
WALKING IN HOSPITAL ROOM: TOTAL
STANDING UP FROM CHAIR USING ARMS: TOTAL
TURNING FROM BACK TO SIDE WHILE IN FLAT BAD: A LITTLE
PERSONAL GROOMING: A LITTLE
MOVING TO AND FROM BED TO CHAIR: TOTAL
DAILY ACTIVITIY SCORE: 20
MOBILITY SCORE: 11
DRESSING REGULAR UPPER BODY CLOTHING: A LITTLE
CLIMB 3 TO 5 STEPS WITH RAILING: TOTAL
TURNING FROM BACK TO SIDE WHILE IN FLAT BAD: A LITTLE
TOILETING: A LITTLE
HELP NEEDED FOR BATHING: A LITTLE
DAILY ACTIVITIY SCORE: 17
MOVING TO AND FROM BED TO CHAIR: A LOT
TURNING FROM BACK TO SIDE WHILE IN FLAT BAD: A LITTLE
HELP NEEDED FOR BATHING: A LITTLE
STANDING UP FROM CHAIR USING ARMS: A LOT
MOBILITY SCORE: 13
WALKING IN HOSPITAL ROOM: TOTAL
MOVING FROM LYING ON BACK TO SITTING ON SIDE OF FLAT BED WITH BEDRAILS: A LITTLE
PATIENT BASELINE BEDBOUND: NO
CLIMB 3 TO 5 STEPS WITH RAILING: TOTAL
CLIMB 3 TO 5 STEPS WITH RAILING: TOTAL
WALKING IN HOSPITAL ROOM: A LOT
DRESSING REGULAR LOWER BODY CLOTHING: A LOT
MOVING TO AND FROM BED TO CHAIR: TOTAL
TOILETING: A LOT
STANDING UP FROM CHAIR USING ARMS: A LOT
MOVING FROM LYING ON BACK TO SITTING ON SIDE OF FLAT BED WITH BEDRAILS: A LITTLE
DRESSING REGULAR LOWER BODY CLOTHING: A LITTLE

## 2024-08-12 ASSESSMENT — ACTIVITIES OF DAILY LIVING (ADL)
DRESSING YOURSELF: INDEPENDENT
GROOMING: INDEPENDENT
JUDGMENT_ADEQUATE_SAFELY_COMPLETE_DAILY_ACTIVITIES: YES
HEARING - RIGHT EAR: FUNCTIONAL
FEEDING YOURSELF: INDEPENDENT
WALKS IN HOME: INDEPENDENT
BATHING: INDEPENDENT
ADEQUATE_TO_COMPLETE_ADL: YES
TOILETING: INDEPENDENT
LACK_OF_TRANSPORTATION: NO
LACK_OF_TRANSPORTATION: NO
PATIENT'S MEMORY ADEQUATE TO SAFELY COMPLETE DAILY ACTIVITIES?: YES
HEARING - LEFT EAR: FUNCTIONAL

## 2024-08-12 ASSESSMENT — PAIN SCALES - GENERAL
PAINLEVEL_OUTOF10: 8
PAINLEVEL_OUTOF10: 2
PAINLEVEL_OUTOF10: 0 - NO PAIN
PAINLEVEL_OUTOF10: 5 - MODERATE PAIN
PAIN_LEVEL: 1
PAINLEVEL_OUTOF10: 7
PAINLEVEL_OUTOF10: 6
PAINLEVEL_OUTOF10: 7
PAINLEVEL_OUTOF10: 0 - NO PAIN
PAINLEVEL_OUTOF10: 0 - NO PAIN

## 2024-08-12 ASSESSMENT — COLUMBIA-SUICIDE SEVERITY RATING SCALE - C-SSRS
1. IN THE PAST MONTH, HAVE YOU WISHED YOU WERE DEAD OR WISHED YOU COULD GO TO SLEEP AND NOT WAKE UP?: NO
6. HAVE YOU EVER DONE ANYTHING, STARTED TO DO ANYTHING, OR PREPARED TO DO ANYTHING TO END YOUR LIFE?: NO
2. HAVE YOU ACTUALLY HAD ANY THOUGHTS OF KILLING YOURSELF?: NO

## 2024-08-12 ASSESSMENT — LIFESTYLE VARIABLES
HOW OFTEN DO YOU HAVE 6 OR MORE DRINKS ON ONE OCCASION: NEVER
HOW OFTEN DO YOU HAVE A DRINK CONTAINING ALCOHOL: 2-3 TIMES A WEEK
AUDIT-C TOTAL SCORE: 4
AUDIT-C TOTAL SCORE: 4
HOW MANY STANDARD DRINKS CONTAINING ALCOHOL DO YOU HAVE ON A TYPICAL DAY: 3 OR 4
SKIP TO QUESTIONS 9-10: 0

## 2024-08-12 ASSESSMENT — PAIN DESCRIPTION - ORIENTATION
ORIENTATION: LEFT
ORIENTATION: LEFT

## 2024-08-12 ASSESSMENT — PAIN DESCRIPTION - LOCATION
LOCATION: HIP
LOCATION: HIP

## 2024-08-12 ASSESSMENT — PATIENT HEALTH QUESTIONNAIRE - PHQ9
1. LITTLE INTEREST OR PLEASURE IN DOING THINGS: NOT AT ALL
SUM OF ALL RESPONSES TO PHQ9 QUESTIONS 1 & 2: 0
2. FEELING DOWN, DEPRESSED OR HOPELESS: NOT AT ALL

## 2024-08-12 ASSESSMENT — PAIN DESCRIPTION - DESCRIPTORS
DESCRIPTORS: BURNING
DESCRIPTORS: BURNING

## 2024-08-12 NOTE — ANESTHESIA PREPROCEDURE EVALUATION
Grecia Stephenson is a 63 y.o. female here for:      Arthroplasty Total Hip Robot-Assisted  With Steven Mancia MD  Pre-Op Diagnosis Codes:      * Unilateral primary osteoarthritis, left hip [M16.12]    Lab Results   Component Value Date    HGB 13.4 07/29/2024    HCT 39.9 07/29/2024    WBC 8.1 07/29/2024     07/29/2024     07/29/2024    K 4.3 07/29/2024     07/29/2024    CREATININE 0.74 07/29/2024    BUN 16 07/29/2024       Social History     Substance and Sexual Activity   Drug Use Never      Tobacco Use: Low Risk  (8/12/2024)    Patient History     Smoking Tobacco Use: Never     Smokeless Tobacco Use: Never     Passive Exposure: Never      Social History     Substance and Sexual Activity   Alcohol Use Yes    Alcohol/week: 3.0 standard drinks of alcohol    Types: 3 Standard drinks or equivalent per week        Allergies   Allergen Reactions    Sulfa (Sulfonamide Antibiotics) Hives, Rash and GI Upset       Current Outpatient Medications   Medication Instructions    atorvastatin (LIPITOR) 10 mg, oral, Daily    cetirizine (ZYRTEC) 10 mg, oral, Daily    lisinopril 10 mg, oral, Daily    metoprolol tartrate (LOPRESSOR) 50 mg, oral, 2 times daily    multivitamin tablet 1 tablet, oral, Daily    naloxone (Narcan) 4 mg/0.1 mL nasal spray Instill 1 spray intranasally for opioid overdose; repeat in 5 minutes if no response.       Past Medical History:   Diagnosis Date    Arthritis     Hyperlipidemia     Hypertension     Joint pain     Personal history of other diseases of the circulatory system 02/01/2022    History of hypertension    Personal history of other diseases of the musculoskeletal system and connective tissue 02/01/2022    History of arthritis       Past Surgical History:   Procedure Laterality Date    BREAST SURGERY  02/01/2022    Breast augmentation    WISDOM TOOTH EXTRACTION         No family history on file.    Relevant Problems   Musculoskeletal   (+) Unilateral primary  "osteoarthritis, left hip       Visit Vitals  /60   Pulse 68   Temp 36.3 °C (97.3 °F)   Resp 16   Ht 1.651 m (5' 5\")   Wt 56.1 kg (123 lb 10.9 oz)   SpO2 94%   BMI 20.58 kg/m²   OB Status Postmenopausal   Smoking Status Never   BSA 1.6 m²       NPO Details:  NPO/Void Status  Carbohydrate Drink Given Prior to Surgery? : N  Date of Last Liquid: 08/11/24  Time of Last Liquid: 2230 (sips of water with meds)  Date of Last Solid: 08/11/24  Time of Last Solid: 2230  Last Intake Type: Clear fluids  Time of Last Void: 0802        Physical Exam    Airway  Mallampati: II     Cardiovascular - normal exam     Dental - normal exam     Pulmonary - normal exam     Abdominal - normal exam  Abdomen: soft             Anesthesia Plan    History of general anesthesia?: yes  History of complications of general anesthesia?: no    ASA 2     spinal     intravenous induction   Anesthetic plan and risks discussed with patient.    Plan discussed with CRNA.      "

## 2024-08-12 NOTE — CARE PLAN
Problem: Fall/Injury  Goal: Not fall by end of shift  Outcome: Progressing  Goal: Use assistive devices by end of the shift  Outcome: Progressing     Problem: Pain  Goal: Takes deep breaths with improved pain control throughout the shift  Outcome: Progressing  Goal: Free from opioid side effects throughout the shift  Outcome: Progressing

## 2024-08-12 NOTE — PROGRESS NOTES
08/12/24 1412   Discharge Planning   Living Arrangements Alone  (pts mother will be staying a few nights to assist as needed)   Support Systems Parent;Family members;Friends/neighbors   Assistance Needed None, PTA states independent ADLS and IADLS no AD, drives, works, denies falls. Pt owns walker, canes x 2, toilet riser, non-slip mat for tub but no seat, multiple grab bars, tub shower   Type of Residence Private residence  (4th floor apartment with elevator access, 6 steps to enter main entrance)   Number of Stairs to Enter Residence 0   Number of Stairs Within Residence 6   Do you have animals or pets at home? Yes   Type of Animals or Pets 1 Cat   Home or Post Acute Services In home services   Type of Home Care Services Home OT;Home PT   Expected Discharge Disposition HH Services   Financial Resource Strain   How hard is it for you to pay for the very basics like food, housing, medical care, and heating? Not hard   Housing Stability   In the last 12 months, was there a time when you were not able to pay the mortgage or rent on time? N   In the past 12 months, how many times have you moved where you were living? 0   At any time in the past 12 months, were you homeless or living in a shelter (including now)? N   Transportation Needs   In the past 12 months, has lack of transportation kept you from medical appointments or from getting medications? no   In the past 12 months, has lack of transportation kept you from meetings, work, or from getting things needed for daily living? No   Patient Choice   Provider Choice list and CMS website (https://medicare.gov/care-compare#search) for post-acute Quality and Resource Measure Data were provided and reviewed with: Patient   Patient / Family choosing to utilize agency / facility established prior to hospitalization No     Pt is s/p Elective Left Alex total hip arthroplasty 8/12/24 with Dr. Steven Mancia, pt is weight bearing as tolerated,  no hip precautions to  operative extremity, pt will DC on Aspirin 81 mg PO BID for 30 days. PT/OT eval AMPAC scores are currently pending. Pt discharge preference is home with [facility] and agency of choice is [facility]. Demographics verified. CNP notified of pt C agency of choice. CT team will monitor case for progression and DC planning.

## 2024-08-12 NOTE — DISCHARGE INSTRUCTIONS
Total Hip Replacement   Discharge Instructions    To prevent Clot formation, you have been placed on the following medication:  ASA 81 mg twice a day for 30 days started on 8/12/24    Surgical Site Care:  Change dressing once a day and PRN (as needed). Apply 4 x 4 sponge and light tape. If glue present, leave open to air.  You may leave wound open to air after initial dressing removal, if wound is clean, dry and intact  If Aquacel Ag dressing is present, do not remove dressing for 7 days, unless heavily saturated. If heavily saturated, remove dressing and start using instructions above  Staples will be removed on post-operative day 14 and steri-strips applied  Showering is permitted starting POD1 if waterproof Aquacel dressing is present or when the incision is covered with 4 x 4 and Tegaderm waterproof dressing  Until all areas of incision are healed.    Physical Therapy:  Weight Bearing Status:  Weight-bearing as tolerated  No Hip precautions, per therapy handout     Pain Medications  You were given  oxycodone  Wean off pain medications as you deem appropriate as long as pain is under control  Do not exceed 4,000 mg of acetaminophen from all sources in a 24 hour period    Cold packs/Ice packs/Machine  May be used 5 times daily for 15-30 minutes as necessary  Be sure to have a barrier (cloth, clothing, towel) between the site and the ice pack to prevent frostbite    Contact [facility] office if  Increased redness, swelling, drainage of any kind, and/or pain to surgery site.  As well as new onset fevers and or chills.  These could signify an infection.  Calf or thigh tenderness to touch as well as increased swelling or redness.  This could signify a clot formation.  Numbness or tingling to an area around the incision site or below the incision site (toes).  Any rash appears, increased  or new onset nausea/vomiting occur.  This may indicate a reaction to a medication.  Phone # [phone].  Follow up  with Surgeon in 2 weeks  I acknowledge that I have received alexandru hose and understand the instructions on how and when to wear them (on during the day, off at night)   Discharging RN who has gone over instructions and acknowledges alexandru hose have been received     Ice 5 times a day for 20 minutes each session to operative extremity for two weeks.   ALEXANDRU hose to be worn for three weeks. Can be removed for skin care and hygiene.   Incentive spirometer 10 times every hour while awake for two weeks.

## 2024-08-12 NOTE — PROGRESS NOTES
Physical Therapy    Physical Therapy Evaluation & Treatment    Patient Name: Grecia Stephenson  MRN: [id number]  Today's Date: 8/12/2024   Time Calculation  Start Time: 1445  Stop Time: 1515  Time Calculation (min): 30 min  605/605-A    Assessment/Plan   PT Assessment  PT Assessment Results: Decreased strength, Decreased endurance, Impaired balance, Decreased mobility  Rehab Prognosis: Good  Barriers to Discharge: Decreased motor control L LE  Evaluation/Treatment Tolerance: Patient tolerated treatment well  Medical Staff Made Aware: Yes  Strengths: Ability to acquire knowledge, Access to adaptive/assistive products, Attitude of self, Coping skills, Living arrangement secure, Support of Caregivers  Barriers to Participation:  (Decreased motor control L LE)  End of Session Communication: Bedside nurse, PCT/NA/CTA  Assessment Comment: Recommend continued therapy in acute care followed by continued therapy at a low intensity level following D/C.  End of Session Patient Position: Alarm on, Bed, 2 rail up (Call light within reach)  IP OR SWING BED PT PLAN  Inpatient or Swing Bed: Inpatient  PT Plan  Treatment/Interventions: Bed mobility, Transfer training, Gait training, Stair training, Strengthening, Therapeutic exercise, Home exercise program  PT Plan: Ongoing PT  PT Frequency: BID  PT Discharge Recommendations: Low intensity level of continued care  PT Recommended Transfer Status: Assistive device, Assist x2 (sit/stand only on eval)  Physical Therapy eval completed per MD requisition. P.T. recommendations as outlined above. Recommend D/C from acute care when medically appropriate as deemed by medical staff.      Subjective     General Visit Information:  General  Reason for Referral: L ZINA  Referred By: Dr. Mancia (PT/OT 8/12)  Past Medical History Relevant to Rehab: includes: HTN, HLD, OA  Family/Caregiver Present: No  Prior to Session Communication: Bedside nurse  Patient Position Received: Bed, 2 rail up, Alarm  on  Preferred Learning Style: auditory, verbal, written  General Comment: Pt. is a 64yo [place] [facility] a scheduled L ZINA by Dr. Mancia.    Home Living:  Home Living  Home Living Comments: Pt. lives with her mother in a 4th floor apt. Elevator access to enter building until 5pm then 2 CIARA wtih HR after 5pm. Elevator access to 4th floor. Tub shower with a grab bar but no seat. Laundry on 1st floor with elevator access.    Prior Level of Function:  Prior Function Per Pt/Caregiver Report  Prior Function Comments: Pt. amb without AD PTA but owns a cane and a FWW. Pt. owns a long handled shoehorn and a sockaid. Pt. was I with ADLs and IADLs PTA. Pt. denied falls in last 3 months. Pt. drove PTA.    Precautions:  Precautions  LE Weight Bearing Status: Weight Bearing as Tolerated  Precautions Comment: No ZINA precautions per Dr. Mancia         Objective     Pain:  Pain Assessment  Pain Assessment: 0-10  0-10 (Numeric) Pain Score: 5 - Moderate pain  Pain Type: Acute pain, Surgical pain  Pain Location: Hip  Pain Orientation: Left  Pain Interventions: Cold applied, Repositioned    Cognition:  Cognition  Overall Cognitive Status: Within Functional Limits (Pt. very anxious)    General Assessments:  General Observation  General Observation: IV, Purewick   Activity Tolerance  Endurance: Tolerates 10 - 20 min exercise with multiple rests                 Dynamic Sitting Balance  Dynamic Sitting-Comments: Good static and dynamic sitting balance  Dynamic Standing Balance  Dynamic Standing-Comments: Poor static standing balance    Functional Assessments:     Bed Mobility  Bed Mobility: Yes  Bed Mobility 1  Bed Mobility 1: Supine to sitting  Level of Assistance 1: Minimum assistance  Bed Mobility Comments 1: A to maneuver L LE over EOB and VC's to use UEs to elevate trunk from bed and scoot hips to EOB  Bed Mobility 2  Bed Mobility  2: Sitting to supine  Level of Assistance 2: Moderate assistance  Bed Mobility  Comments 2: A to lift LEs onto bed  Transfers  Transfer: Yes  Transfer 1  Technique 1: Sit to stand  Transfer Device 1: Gait belt (FWW)  Transfer Level of Assistance 1: Moderate assistance, +2  Trials/Comments 1: A for lifting, transfering weight forward over feet, steadying. VC's for hand placement. Pt. maintained standing with MOD A X 2 but was unable to march in place.  Transfers 2  Technique 2: Stand to sit  Transfer Device 2: Gait belt (FWW)  Transfer Level of Assistance 2: Moderate assistance, +2  Trials/Comments 2: A to control descent. VC's for hand placement. FWW used  Ambulation/Gait Training  Ambulation/Gait Training Performed: No (Pt. was unable to take any steps due to decreased motor control L LE.)          Extremity/Trunk Assessments:        RLE   RLE : Within Functional Limits  LLE   LLE :  (AROM WFL, strength: Hip flex 2+/5, knee ext 2+/5, ankle DF/PF 4/5)    Treatments:  Therapeutic Exercise  Therapeutic Exercise Performed: Yes  Therapeutic Exercise Activity 1: Supine B LE ther ex 1 x 15: QS, AP, GS, Hip ABD, HS, SAQ.  PROM SAQ L LE                   Outcome Measures:  [facility] Basic Mobility  Turning from your back to your side while in a flat bed without using bedrails: A little  Moving from lying on your back to sitting on the side of a flat bed without using bedrails: A little  Moving to and from bed to chair (including a wheelchair): Total  Standing up from a chair using your arms (e.g. wheelchair or bedside chair): A lot  To walk in hospital room: Total  Climbing 3-5 steps with railing: Total  Basic Mobility - Total Score: 11                            Goals:  Encounter Problems       Encounter Problems (Active)       PT Problem       Pt. will transfer supine/sit with MOD I (Progressing)       Start:  08/12/24    Expected End:  08/15/24            Pt. will transfer sit/stand with FWW with MOD I (Progressing)       Start:  08/12/24    Expected End:  08/15/24            Pt.will ambulate 100' with  FWW with MOD I (Progressing)       Start:  08/12/24    Expected End:  08/15/24            Pt. will amb up/down 2 steps with HR and cane with CGA  (Not Progressing)       Start:  08/12/24    Expected End:  08/15/24            Pt. will perform 2 x 15 B LE AROM exercises  (Progressing)       Start:  08/12/24    Expected End:  08/15/24            Patient Stated Goal 1       Start:  08/12/24    Expected End:  08/15/24                 Education Documentation  Home Exercise Program, taught by Luis E Vazquez, PT at 8/12/2024  5:24 PM.  Learner: Patient  Readiness: Acceptance  Method: Explanation  Response: Verbalizes Understanding, Needs Reinforcement  Comment: Role of PT, transfers, amb, safety, PT POC, ther ex    Mobility Training, taught by Luis E Vazquez, PT at 8/12/2024  5:24 PM.  Learner: Patient  Readiness: Acceptance  Method: Explanation  Response: Verbalizes Understanding, Needs Reinforcement  Comment: Role of PT, transfers, amb, safety, PT POC, ther ex

## 2024-08-12 NOTE — OP NOTE
Arthroplasty Total Hip Robot-Assisted (L) Operative Note     Date: 2024  OR Location: [facility]    Name: Grecia Stephenson, : 1960, Age: 63 y.o., MRN: [id number], Sex: female    Diagnosis  Pre-op Diagnosis      * Unilateral primary osteoarthritis, left hip [M16.12] Post-op Diagnosis     * Unilateral primary osteoarthritis, left hip [M16.12]     Procedures  Arthroplasty Total Hip Robot-Assisted  13693 - DE ARTHRP ACETBLR/PROX FEM PROSTC AGRFT/ALGRFT      Surgeons      * Steven Mancia - Primary    Resident/Fellow/Other Assistant:  Surgeons and Role:     * Miladis Rey PA-C - Assisting    Procedure Summary  Anesthesia: Monitor Anesthesia Care, Spinal  ASA: II  Anesthesia Staff: Anesthesiologist: Andre Casillas MD  CRNA: JO Garner-CRNA  Estimated Blood Loss: 100mL  Intra-op Medications:   Administrations occurring from 0930 to 1030 on 24:   Medication Name Total Dose   lactated Ringer's infusion 14.17 mL   ceFAZolin (Ancef) 2 g in dextrose (iso)  mL 2 g              Anesthesia Record               Intraprocedure I/O Totals          Intake    Propofol Drip 0.00 mL    The total shown is the total volume documented since Anesthesia Start was filed.    lactated Ringer's infusion 1000.00 mL    ceFAZolin (Ancef) 2 g in dextrose (iso)  mL 100.00 mL    Total Intake 1100 mL       Output    Est. Blood Loss 50 mL    Total Output 50 mL       Net    Net Volume 1050 mL          Specimen: No specimens collected     Staff:   Circulator: Eugene  Scrub Person: Risa         Drains and/or Catheters: * None in log *    Tourniquet Times:         Implants:  Implants       Type Name Action Serial No.      Joint SHELL, TRIDENT II, CLUSTERHOLE, HA 48D - [id number] Implanted      Joint INSERT, TRIDENT X3 POLYETHYLENE, 10 DEG, 32MM D - [id number] Implanted      Joint STEM, FEM ACCOLADE + TMZF SYS 127D SZ4 - [id number] Implanted               Findings: see procedure details    Indications:  Grecia Stephenson is an 63 y.o. female who is having surgery for Unilateral primary osteoarthritis, left hip [M16.12].     The patient was seen in the preoperative area. The risks, benefits, complications, treatment options, non-operative alternatives, expected recovery and outcomes were discussed with the patient. The possibilities of reaction to medication, pulmonary aspiration, injury to surrounding structures, bleeding, recurrent infection, the need for additional procedures, failure to diagnose a condition, and creating a complication requiring transfusion or operation were discussed with the patient. The patient concurred with the proposed plan, giving informed consent.  The site of surgery was properly noted/marked if necessary per policy. The patient has been actively warmed in preoperative area. Preoperative antibiotics have been ordered and given within 1 hours of incision. Venous thrombosis prophylaxis have been ordered.    Procedure Details:   Preoperative diagnosis DJD hip left  Postoperative diagnosis same  Procedure total hip replacement using the [employer] computer assisted robotic-assisted total hip replacement system    Primary surgeon Steven RICHMOND certified    Implants Ignacio  Cup48  mm Trident hemispherical  Liner  10° elevated lip  Stem Accolade 2 uncemented size4, 127 degree neck angle  Head Biolox delta ceramic 32 mm neck length -4    Anesthesia [spinal]  EBL [100]      Patient is suffering from chronic hip pain that has been refractory to conservative treatment and now presents for total hip replacement.  The risks benefits outcomes and postoperative course were fully explained to the patient.  We discussed  loosening, infection, blood clots, loss of life, loss of limb, nerve damage, numbness, failure of the procedure, progressive arthritis, and the potential need for revision surgery.  The patient understands these risks and consents to the surgical  intervention.    The patient has pain in the hip that is increased with activity and weightbearing, and walks with an antalgic gait.  The pain is interfering with activities of daily living.  Patient has limited range of motion and pain with passive range of motion.  X-rays demonstrate joint space narrowing, subchondral sclerosis and osteophyte formation.  Symptoms are not improving with medication, physical therapy or supportive device for a period of at least 3 months.    The physician assistant was present through the entire case.  Given the nature of the disease process and the procedure to be performed skilled surgical first assistant was necessary during the case.  The assistant was necessary to hold retractors and manipulate the extremity during the procedure.  A certified scrub tech was at the back table managing the instruments and supplies for the surgical case.      Patient was brought to the operating room and placed on the surgical table in the supine position where adequate anesthesia was then obtained.  A surgical timeout was then performed.  The patient was positioned in the lateral decubitus position with the affected hip up being careful to pad all pressure points.  The patient was then prepped and draped in usual sterile manner.     A pelvic array was placed along the anterior iliac crest through 3 percutaneous puncture wounds being careful to protect the lateral femoral cutaneous nerve         A standard posterolateral approach to the hip was made and electrocauterization was used for hemostasis.  The IT band was split in line with its fibers and anterior and posterior retractors were then inserted.    A checkpoint was placed in the greater trochanter and the leg length was measured between the checkpoint and the greater trochanter and the array as well as the markers were placed along the distal femur and the array    The hip was maximally internally rotated and the short external rotators and  piriformis tendon were taken down in 1 layer along with the joint capsule using the Bovie electrocautery device.  The capsule was teed in line with the piriformis tendon.  The hip was then dislocated and the femoral neck resection was made at a  distance that was pre-determined with the preoperative CT scan and 3-D reconstruction in order to reestablish femoral offset and leg length  The femoral head showed signs of severe arthritic changes with joint calcification and loss of articular cartilage.    Anterior and posterior acetabular retractors were then inserted and the acetabular labrum was sharply excised using a knife.  Next acetabulum was curetted to remove any remaining soft tissues and sequential reaming was performed.      We then proceeded to ream with the acetabular reamers and the Hospicelink robotic arm assisted in the appropriate abduction and anteversion we reamed to the appropriate depth which had been predetermined as well based on the preoperative CT scan. Once final reaming was complete this had excellent circumferential fit and good bleeding bone.  Porous acetabular shell was then inserted in exactly 45 degrees of abduction and 20 degrees of anteversion using the robotic arm.  The acetabular shell was probed and stable and was completely seated.  The acetabular liner was then inserted and completely seated as well.  The liner was probed and stable.    At this point femoral preparation was begun.  A femoral neck retractor was inserted and anterior and posterior femoral retractors were inserted as well.  A box osteotome was used to start the femoral canal and a Charnley awl was inserted down the canal.  Sequential broaching was then performed being careful to keep the broaches in 10-15 degrees of anteversion.  With the final broach in position and completely seated calcar planing was performed.  A trial reduction was then performed and the hip was stable to an anatomic range of motion.  Leg lengths were  confirmed using the PIYUSH system.  Trial components were then removed the final femoral stem was inserted and completely seated.  Femoral head and liner were applied to reduced.  Range of motion was again assessed and felt to be satisfactory leg lengths were assessed using the PIYUSH system as well.  The joint was then irrigated with a copious amount of pulsatile irrigation.    Closure was begun using #2 Ethibond sutures through the joint capsule short external rotators and piriformis tendon that were tied through drill holes in the piriformis fossa of the femur.  The IT band was closed using a running locked #1 Vicryl suture.  3-0 Monocryl suture was used for the underlying subcutaneous tissues and 4-0 Monocryl suture was used for subcuticular stitch.  Skin glue was used for the skin and a dry sterile Mepilex dressing was used for bandage.   Pelvic array was removed and wounds closed with 4-0 nylon suture.  Mepilex bandage applied.    The patient tolerated the procedure well and was taken to the postanesthesia care unit in satisfactory condition.  Postoperative x-rays were reviewed and surgical findings were discussed with the patient's family at the conclusion of the procedure.    This note was prepared using voice recognition software.  The details of this note are correct and have been reviewed, and corrected to the best of my ability.  Some grammatical areas may persist related to the Dragon software    Steven Mancia MD    [phone]      Complications:  None; patient tolerated the procedure well.    Disposition: PACU - hemodynamically stable.  Condition: stable         Steven Mancia  Phone Number: [phone]

## 2024-08-12 NOTE — ANESTHESIA PROCEDURE NOTES
Spinal Block    Patient location during procedure: OR  Start time: 8/12/2024 9:21 AM  End time: 8/12/2024 9:36 AM  Reason for block: primary anesthetic  Staffing  Performed: attending   Authorized by: Andre Casillas MD    Performed by: DORA Garner    Preanesthetic Checklist  Completed: patient identified, IV checked, risks and benefits discussed, surgical consent, monitors and equipment checked, pre-op evaluation, timeout performed and sterile techniques followed  Block Timeout  RN/Licensed healthcare professional reads aloud to the Anesthesia provider and entire team: Patient identity, procedure with side and site, patient position, and as applicable the availability of implants/special equipment/special requirements.  Patient on coagulant treatment: no  Timeout performed at: 8/12/2024 9:21 AM  Spinal Block  Patient position: sitting  Prep: Betadine  Sterility prep: cap, drape, gloves, hand hygiene and mask  Sedation level: light sedation  Patient monitoring: blood pressure and continuous pulse oximetry  Approach: midline  Vertebral space: L2-3  Injection technique: single-shot  Needle  Needle type: pencil-point   Needle gauge: 24 G  Needle length: 4 in  Free flowing CSF: yes    Assessment  Sensory level: T6 bilateral  Block outcome: patient comfortable  Procedure assessment: patient tolerated procedure well with no immediate complications

## 2024-08-12 NOTE — CARE PLAN
Problem: Fall/Injury  Goal: Not fall by end of shift  Outcome: Progressing  Goal: Be free from injury by end of the shift  Outcome: Progressing  Goal: Verbalize understanding of personal risk factors for fall in the hospital  Outcome: Progressing  Goal: Verbalize understanding of risk factor reduction measures to prevent injury from fall in the home  Outcome: Progressing  Goal: Pace activities to prevent fatigue by end of the shift  Outcome: Progressing     Problem: Pain  Goal: Takes deep breaths with improved pain control throughout the shift  Outcome: Progressing  Goal: Turns in bed with improved pain control throughout the shift  Outcome: Progressing  Goal: Walks with improved pain control throughout the shift  Outcome: Progressing  Goal: Performs ADL's with improved pain control throughout shift  Outcome: Progressing  Goal: Participates in PT with improved pain control throughout the shift  Outcome: Progressing  Goal: Free from opioid side effects throughout the shift  Outcome: Progressing  Goal: Free from acute confusion related to pain meds throughout the shift  Outcome: Progressing

## 2024-08-12 NOTE — ANESTHESIA POSTPROCEDURE EVALUATION
Patient: Grecia Stephenson    Procedure Summary       Date: 08/12/24 Room / Location: [facility] / [facility]    Anesthesia Start: 0916 Anesthesia Stop:     Procedure: Arthroplasty Total Hip Robot-Assisted (Left: Hip) Diagnosis:       Unilateral primary osteoarthritis, left hip      (Unilateral primary osteoarthritis, left hip [M16.12])    Surgeons: Steven Mancia MD Responsible Provider: Andre Casillas MD    Anesthesia Type: MAC, spinal ASA Status: 2            Anesthesia Type: MAC, spinal    Vitals Value Taken Time   /55 08/12/24 1040   Temp 36 08/12/24 1043   Pulse 73 08/12/24 1043   Resp 16 08/12/24 1043   SpO2 100 08/12/24 1043   Vitals shown include unfiled device data.    Anesthesia Post Evaluation    Patient location during evaluation: bedside  Patient participation: complete - patient participated  Level of consciousness: awake  Pain score: 1  Pain management: adequate  Airway patency: patent  Cardiovascular status: acceptable  Respiratory status: acceptable  Hydration status: acceptable  Postoperative Nausea and Vomiting: none        There were no known notable events for this encounter.

## 2024-08-12 NOTE — INTERVAL H&P NOTE
History and physical is reviewed, patient re evaluated, no changes.  Procedure, risks, benefits, and postoperative course were discussed with the patient and consent is reviewed.    Steven Mancia MD

## 2024-08-12 NOTE — PROGRESS NOTES
Patient seen upon arrival to floor from PACU s/p left total hip arthroplasty using PIYUSH.     She currently does not have any sensation to her lower extremities and reports being numb. She has some mild pain to operative extremity primarily at the surgical incision.     Plan of care and discharge planning discussed. She plans to return to home with [facility], orders placed.     She may WBAT with no hip precautions to operative extremity.   ASA 81 mg BID for 30 days.

## 2024-08-13 ENCOUNTER — DOCUMENTATION (OUTPATIENT)
Dept: HOME HEALTH SERVICES | Facility: HOME HEALTH | Age: 64
End: 2024-08-13
Payer: MEDICAID

## 2024-08-13 ENCOUNTER — PHARMACY VISIT (OUTPATIENT)
Dept: PHARMACY | Facility: CLINIC | Age: 64
End: 2024-08-13
Payer: MEDICAID

## 2024-08-13 VITALS
DIASTOLIC BLOOD PRESSURE: 58 MMHG | HEART RATE: 73 BPM | RESPIRATION RATE: 18 BRPM | OXYGEN SATURATION: 96 % | WEIGHT: 123.68 LBS | BODY MASS INDEX: 20.61 KG/M2 | TEMPERATURE: 98.4 F | SYSTOLIC BLOOD PRESSURE: 102 MMHG | HEIGHT: 65 IN

## 2024-08-13 PROBLEM — M16.12 UNILATERAL PRIMARY OSTEOARTHRITIS, LEFT HIP: Status: RESOLVED | Noted: 2024-07-08 | Resolved: 2024-08-13

## 2024-08-13 LAB
ANION GAP SERPL CALC-SCNC: 10 MMOL/L (ref 10–20)
BUN SERPL-MCNC: 19 MG/DL (ref 6–23)
CALCIUM SERPL-MCNC: 8.4 MG/DL (ref 8.6–10.3)
CHLORIDE SERPL-SCNC: 101 MMOL/L (ref 98–107)
CO2 SERPL-SCNC: 28 MMOL/L (ref 21–32)
CREAT SERPL-MCNC: 1.26 MG/DL (ref 0.5–1.05)
EGFRCR SERPLBLD CKD-EPI 2021: 49 ML/MIN/1.73M*2
ERYTHROCYTE [DISTWIDTH] IN BLOOD BY AUTOMATED COUNT: 13.4 % (ref 11.5–14.5)
GLUCOSE SERPL-MCNC: 113 MG/DL (ref 74–99)
HCT VFR BLD AUTO: 32.9 % (ref 36–46)
HGB BLD-MCNC: 11.5 G/DL (ref 12–16)
MCH RBC QN AUTO: 32.9 PG (ref 26–34)
MCHC RBC AUTO-ENTMCNC: 35 G/DL (ref 32–36)
MCV RBC AUTO: 94 FL (ref 80–100)
NRBC BLD-RTO: 0 /100 WBCS (ref 0–0)
PLATELET # BLD AUTO: 205 X10*3/UL (ref 150–450)
POTASSIUM SERPL-SCNC: 3.9 MMOL/L (ref 3.5–5.3)
RBC # BLD AUTO: 3.5 X10*6/UL (ref 4–5.2)
SODIUM SERPL-SCNC: 135 MMOL/L (ref 136–145)
WBC # BLD AUTO: 7 X10*3/UL (ref 4.4–11.3)

## 2024-08-13 PROCEDURE — 7100000011 HC EXTENDED STAY RECOVERY HOURLY - NURSING UNIT

## 2024-08-13 PROCEDURE — 82565 ASSAY OF CREATININE: CPT | Performed by: ORTHOPAEDIC SURGERY

## 2024-08-13 PROCEDURE — RXMED WILLOW AMBULATORY MEDICATION CHARGE

## 2024-08-13 PROCEDURE — 2500000002 HC RX 250 W HCPCS SELF ADMINISTERED DRUGS (ALT 637 FOR MEDICARE OP, ALT 636 FOR OP/ED): Performed by: ORTHOPAEDIC SURGERY

## 2024-08-13 PROCEDURE — 2500000001 HC RX 250 WO HCPCS SELF ADMINISTERED DRUGS (ALT 637 FOR MEDICARE OP): Performed by: NURSE PRACTITIONER

## 2024-08-13 PROCEDURE — 2500000004 HC RX 250 GENERAL PHARMACY W/ HCPCS (ALT 636 FOR OP/ED): Performed by: ORTHOPAEDIC SURGERY

## 2024-08-13 PROCEDURE — 85027 COMPLETE CBC AUTOMATED: CPT | Performed by: ORTHOPAEDIC SURGERY

## 2024-08-13 PROCEDURE — 97110 THERAPEUTIC EXERCISES: CPT | Mod: GP,CQ

## 2024-08-13 PROCEDURE — 2500000004 HC RX 250 GENERAL PHARMACY W/ HCPCS (ALT 636 FOR OP/ED)

## 2024-08-13 PROCEDURE — 36415 COLL VENOUS BLD VENIPUNCTURE: CPT | Performed by: ORTHOPAEDIC SURGERY

## 2024-08-13 PROCEDURE — 97116 GAIT TRAINING THERAPY: CPT | Mod: GP,CQ

## 2024-08-13 PROCEDURE — 97165 OT EVAL LOW COMPLEX 30 MIN: CPT | Mod: GO

## 2024-08-13 PROCEDURE — 2500000001 HC RX 250 WO HCPCS SELF ADMINISTERED DRUGS (ALT 637 FOR MEDICARE OP): Performed by: ORTHOPAEDIC SURGERY

## 2024-08-13 RX ORDER — ASPIRIN 81 MG/1
81 TABLET ORAL 2 TIMES DAILY
Qty: 60 TABLET | Refills: 0 | Status: SHIPPED | OUTPATIENT
Start: 2024-08-13 | End: 2024-09-12

## 2024-08-13 RX ORDER — ACETAMINOPHEN 325 MG/1
650 TABLET ORAL EVERY 6 HOURS SCHEDULED
Qty: 56 TABLET | Refills: 0 | Status: SHIPPED | OUTPATIENT
Start: 2024-08-13 | End: 2024-08-20

## 2024-08-13 RX ORDER — CYCLOBENZAPRINE HCL 10 MG
10 TABLET ORAL 3 TIMES DAILY PRN
Qty: 21 TABLET | Refills: 0 | Status: SHIPPED | OUTPATIENT
Start: 2024-08-13 | End: 2024-08-20

## 2024-08-13 RX ORDER — OXYCODONE HYDROCHLORIDE 5 MG/1
5 TABLET ORAL EVERY 6 HOURS PRN
Qty: 28 TABLET | Refills: 0 | Status: SHIPPED | OUTPATIENT
Start: 2024-08-13 | End: 2024-08-20

## 2024-08-13 RX ORDER — DOCUSATE SODIUM 100 MG/1
100 CAPSULE, LIQUID FILLED ORAL 2 TIMES DAILY
Qty: 20 CAPSULE | Refills: 0 | Status: SHIPPED | OUTPATIENT
Start: 2024-08-13 | End: 2024-08-23

## 2024-08-13 ASSESSMENT — COGNITIVE AND FUNCTIONAL STATUS - GENERAL
CLIMB 3 TO 5 STEPS WITH RAILING: A LITTLE
WALKING IN HOSPITAL ROOM: A LITTLE
STANDING UP FROM CHAIR USING ARMS: A LITTLE
PERSONAL GROOMING: A LITTLE
MOBILITY SCORE: 10
TOILETING: A LOT
DRESSING REGULAR LOWER BODY CLOTHING: A LITTLE
HELP NEEDED FOR BATHING: A LITTLE
MOVING TO AND FROM BED TO CHAIR: A LITTLE
CLIMB 3 TO 5 STEPS WITH RAILING: TOTAL
PERSONAL GROOMING: A LITTLE
DAILY ACTIVITIY SCORE: 20
HELP NEEDED FOR BATHING: A LITTLE
TOILETING: A LITTLE
TURNING FROM BACK TO SIDE WHILE IN FLAT BAD: A LITTLE
DAILY ACTIVITIY SCORE: 17
DRESSING REGULAR UPPER BODY CLOTHING: A LITTLE
MOVING FROM LYING ON BACK TO SITTING ON SIDE OF FLAT BED WITH BEDRAILS: A LITTLE
WALKING IN HOSPITAL ROOM: TOTAL
MOBILITY SCORE: 20
STANDING UP FROM CHAIR USING ARMS: TOTAL
MOVING TO AND FROM BED TO CHAIR: TOTAL
DRESSING REGULAR LOWER BODY CLOTHING: A LOT

## 2024-08-13 ASSESSMENT — PAIN - FUNCTIONAL ASSESSMENT
PAIN_FUNCTIONAL_ASSESSMENT: 0-10

## 2024-08-13 ASSESSMENT — PAIN SCALES - GENERAL
PAINLEVEL_OUTOF10: 3
PAINLEVEL_OUTOF10: 8
PAINLEVEL_OUTOF10: 7
PAINLEVEL_OUTOF10: 10 - WORST POSSIBLE PAIN
PAINLEVEL_OUTOF10: 5 - MODERATE PAIN

## 2024-08-13 ASSESSMENT — ACTIVITIES OF DAILY LIVING (ADL): BATHING_ASSISTANCE: STAND BY

## 2024-08-13 ASSESSMENT — PAIN DESCRIPTION - LOCATION: LOCATION: HIP

## 2024-08-13 ASSESSMENT — PAIN DESCRIPTION - ORIENTATION: ORIENTATION: LEFT

## 2024-08-13 NOTE — PROGRESS NOTES
08/13/24 0838   Discharge Planning   Home or Post Acute Services In home services   Type of Home Care Services Home OT;Home PT   Expected Discharge Disposition HH Services  ([facility])     Pt is s/p POD #1 Elective Left Alxe total hip arthroplasty 8/12/24 with Dr. Steven Mancia, pt is weight bearing as tolerated,  no hip precautions to operative extremity, pt will DC on Aspirin 81 mg PO BID for 30 days. AMPAC scores are PT (11) OT (pending) recommending continued therapy at low level/intensity. Pt discharge preference remains home with [facility]. Demographics verified. [facility]  notified of [facility] referral/HCO in Epic, awaiting confirmation of SOC.

## 2024-08-13 NOTE — PROGRESS NOTES
Hip surgery    Progress Note    Subjective:     Post-Operative Day # 1   Status Post left Hip Arthroplasty    Systemic or Specific Complaints: No Complaints    Objective:     Visit Vitals  /58   Pulse 73   Temp 36.9 °C (98.4 °F)   Resp 18       General: alert and oriented, in no acute distress, appears stated age, and cooperative   Wound: no erythema, no edema, no drainage, and dressing is clean, dry, and intact   Motion: Painful range of Motion   DVT Exam: No evidence of DVT seen on physical exam.  Negative Kaley's sign.  No significant calf/ankle edema.       NVI in lower extremity. left thigh soft to palpation. Strong equal dorsi/plantar flexion bilaterally. Good distal pulses bilaterally.      Data Review  Recent Results (from the past 24 hour(s))   CBC    Collection Time: 08/13/24  4:52 AM   Result Value Ref Range    WBC 7.0 4.4 - 11.3 x10*3/uL    nRBC 0.0 0.0 - 0.0 /100 WBCs    RBC 3.50 (L) 4.00 - 5.20 x10*6/uL    Hemoglobin 11.5 (L) 12.0 - 16.0 g/dL    Hematocrit 32.9 (L) 36.0 - 46.0 %    MCV 94 80 - 100 fL    MCH 32.9 26.0 - 34.0 pg    MCHC 35.0 32.0 - 36.0 g/dL    RDW 13.4 11.5 - 14.5 %    Platelets 205 150 - 450 x10*3/uL   Basic metabolic panel    Collection Time: 08/13/24  4:52 AM   Result Value Ref Range    Glucose 113 (H) 74 - 99 mg/dL    Sodium 135 (L) 136 - 145 mmol/L    Potassium 3.9 3.5 - 5.3 mmol/L    Chloride 101 98 - 107 mmol/L    Bicarbonate 28 21 - 32 mmol/L    Anion Gap 10 10 - 20 mmol/L    Urea Nitrogen 19 6 - 23 mg/dL    Creatinine 1.26 (H) 0.50 - 1.05 mg/dL    eGFR 49 (L) >60 mL/min/1.73m*2    Calcium 8.4 (L) 8.6 - 10.3 mg/dL     XR hip left with pelvis when performed 1 view    Result Date: 8/12/2024  Interpreted By:  Isaura Pope, STUDY: XR HIP LEFT WITH PELVIS WHEN PERFORMED 1 VIEW;  8/12/2024 11:15 am   INDICATION: Signs/Symptoms:Post op hip.   COMPARISON: 05/01/2024   ACCESSION NUMBER(S): [id number]   ORDERING CLINICIAN: CUAUHTEMOC ALVA   TECHNIQUE: A single portable image  of the left hip was obtained.   FINDINGS: The patient is status post total left hip replacement. The hardware appears intact.   COMPARISON OF FINDINGS: The surgery was performed in the interval.       Status post total left hip replacement.     MACRO: none   Signed by: Isaura Pope 8/12/2024 12:27 PM Dictation workstation:   [id number]    ECG 12 Lead    Result Date: 7/31/2024  Sinus bradycardia Otherwise normal ECG When compared with ECG of 07-OCT-2012 09:43, Vent. rate has decreased BY  33 BPM T wave inversion no longer evident in Anterior leads QT has shortened Confirmed by Stna Mullen ([id number]) on 7/31/2024 10:30:27 AM    XR chest 2 views    Result Date: 7/30/2024  Interpreted By:  Caro Holm, STUDY: XR CHEST 2 VIEWS 7/29/2024 11:15 am   INDICATION: Signs/Symptoms:z00.01. History of tobacco abuse.   COMPARISON: 10/05/2012   ACCESSION NUMBER(S): [id number]   ORDERING CLINICIAN: JENNIFER ABRAMS   TECHNIQUE: PA and lateral views of the chest were acquired.   FINDINGS: Heart and mediastinum are normal in appearance. The lungs are clear without pleural abnormality. There are degenerative changes of the thoracic spine.       No acute cardiopulmonary disease.   Signed by: Caro Holm 7/30/2024 7:39 PM Dictation workstation:   [id number]    CT hip left wo IV contrast    Result Date: 7/29/2024  These images are not reportable by radiology and will not be interpreted by  Radiologists.      Assessment:     Status Post left Hip Arthroplasty. Doing well postoperatively. No acute events overnight.     Acute postoperative pain: Reports mild to moderate pain to operative extremity. Exacerbated by movement, relieved by rest ice and pain medication. Continue current pain management.     Acute postoperative blood loss anemia: Secondary to expected surgical blood loss. Hemoglobin this A.M. 11.5.  Patient asymptomatic, and remains hemodynamically stable with no signs of active bleeding.    Patient noted to have mild bump in  creatinine this morning 1.26. Will give 500 ml normal saline bolus.      Plan:      1.  Discharge today, Return to Clinic: in 2 weeks    2.  Continue Deep venous thrombosis prophylaxis: Aspirin 81 mg BID for 30 days  3.  Continue physical therapy: Weight-bearing as tolerated with no hip precautions to operative extremity  4.  Continue Pain Control: scripts sent  5.  Discharge planning: patient plans to return to home with  home care at discharge, orders placed. [facility] and [facility] following for discharge planning.       CHARLES Tijerina   8/13/2024 8:07 AM

## 2024-08-13 NOTE — HH CARE COORDINATION
Home Care received a Referral for Physical Therapy and Occupational Therapy. We have processed the referral for a Start of Care on 8/14/24.     If you have any questions or concerns, please feel free to contact us at [phone]. Follow the prompts, enter your five digit zip code, and you will be directed to your care team on [facility] 2.

## 2024-08-13 NOTE — PROGRESS NOTES
Occupational Therapy    Evaluation    Patient Name: Grecia Stephenson  MRN: [id number]  Today's Date: 8/13/2024  Time Calculation  Start Time: 0908  Stop Time: 0933  Time Calculation (min): 25 min      Assessment:  End of Session Communication: Bedside nurse  End of Session Patient Position: Up in chair, Alarm on     Plan:  No Skilled OT: No acute OT goals identified  OT Frequency: OT eval only  OT Discharge Recommendations: No further acute OT  OT - OK to Discharge: Yes (Once medically appropriate.)     Subjective     General:  General  Reason for Referral: Recent surgery  Referred By: Dr. Mancia  Past Medical History Relevant to Rehab: HTN, HLD, OA  Patient Position Received: Up in chair, Alarm on  General Comment: Pt. is a 64yo who presented to [facility] on 8/12/2024 for a scheduled L ZINA by Dr. Mancia.  Precautions:  Precautions Comment: No hip precautions. WBAT L LE.     Pain:  Pain Assessment  Pain Assessment: 0-10  0-10 (Numeric) Pain Score: 7  Pain Type: Surgical pain  Pain Location: Hip  Pain Orientation: Left  Pain Interventions: Repositioned    Objective   Cognition:  Overall Cognitive Status: Within Functional Limits  Orientation Level: Oriented X4           Home Living:  Home Living Comments: Patient lives alone in a 4th floor apartment (mother will be staying with patient to assist). 0 CIARA into the building, 6 steps with a HR to the level with elevator access. Laundry in the basement, elevator access. Tub shower with a grab bar, elevated toilet seat.  Prior Function:  Prior Function Comments: Patient ambulates independently without a device, owns a cane and FWW. Owns a reacher, sock aid and long handled shoehorn. Independent with ADLs and IADLs. Denies falls in the past 3 months. Drives.     ADL:  Eating Assistance: Independent  Grooming Assistance:  (SBA for standing balance with occasional UE support on the FWW to brush teeth.)  Bathing Assistance: Stand by  UE Dressing Assistance: Independent  (Completed seated in the recliner.)  LE Dressing Assistance: Stand by (Patient utilized a reacher to thread LEs - initial cues to thread the L LE first. SBA for standing balance without UE support to pull clothing up over hips. Able to don B/L slip on shoes without assist while seated in the recliner.)  Toileting Assistance with Device: Stand by     Bed Mobility/Transfers:      Transfer 1  Technique 1: Sit to stand, Stand to sit  Transfer Device 1:  (FWW)  Trials/Comments 1: Patient completed sit <> stand from the recliner with a FWW at SBA level.    Functional Mobility:  Functional Mobility  Functional Mobility Performed: Yes  Functional Mobility 1  Comments 1: Patient completed functional mobility throughout the room with a FWW at SBA level.     Standing Balance:  Dynamic Standing Balance  Dynamic Standing-Comments: Fair +     Strength:  Strength Comments: B/L UE MMT WFL.     Extremities: RUE   RUE : Within Functional Limits and LUE   LUE: Within Functional Limits    Outcome Measures:[facility] Daily Activity  Putting on and taking off regular lower body clothing: A little  Bathing (including washing, rinsing, drying): A little  Putting on and taking off regular upper body clothing: None  Toileting, which includes using toilet, bedpan or urinal: A little  Taking care of personal grooming such as brushing teeth: A little  Eating Meals: None  Daily Activity - Total Score: 20    Education Documentation  ADL Training, taught by Monica Saxena OT at 8/13/2024 12:33 PM.  Learner: Patient  Readiness: Acceptance  Method: Explanation  Response: Verbalizes Understanding  Comment: Educated on AE for LB ADLs, DME for the bathroom - tub transfer technique/sponge bathing, compensatory LB ADL techniques and walker management during ADLs/IADLs.

## 2024-08-13 NOTE — PROGRESS NOTES
Physical Therapy    Physical Therapy    Physical Therapy Treatment    Patient Name: Grecia Stephenson  MRN: [id number]  Today's Date: 8/13/2024  Time Calculation  Start Time: 1120  Stop Time: 1155  Time Calculation (min): 35 min       Assessment/Plan   PT Assessment  PT Assessment Results: Decreased strength, Decreased endurance, Impaired balance, Decreased mobility  Rehab Prognosis: Good  Barriers to Discharge: Decreased motor control L LE  Evaluation/Treatment Tolerance: Patient tolerated treatment well  Medical Staff Made Aware: Yes  Strengths: Ability to acquire knowledge, Access to adaptive/assistive products, Attitude of self, Coping skills, Living arrangement secure, Support of Caregivers  Barriers to Participation:  (Decreased motor control L LE)  End of Session Communication: Bedside nurse  Assessment Comment: Recommend continued therapy in acute care followed by continued therapy at a low intensity level following D/C.  End of Session Patient Position: Up in chair, Alarm on     PT Plan  Treatment/Interventions: Bed mobility, Transfer training, Gait training, Stair training, Strengthening, Therapeutic exercise, Home exercise program  PT Plan: Ongoing PT  PT Frequency: BID  PT Discharge Recommendations: Low intensity level of continued care      Current Problem:  1. Unilateral primary osteoarthritis, left hip        2. Status post left hip replacement  Referral to Home Health    acetaminophen (Tylenol) 325 mg tablet    aspirin 81 mg EC tablet    cyclobenzaprine (Flexeril) 10 mg tablet    docusate sodium (Colace) 100 mg capsule    oxyCODONE (Roxicodone) 5 mg immediate release tablet          General Visit Information:   PT  Visit  PT Received On: 08/13/24  General  Reason for Referral: L THR  Prior to Session Communication: Bedside nurse  Patient Position Received: Up in chair, Alarm on  Preferred Learning Style: verbal  General Comment:  (Pt sitting up in recliner- ice to L hip.  Instructed pt on proper  alignment of LLE in a longsitting position.)  Subjective     Precautions:  Precautions  LE Weight Bearing Status: Weight Bearing as Tolerated  Medical Precautions: Fall precautions  Precautions Comment: No hip precautions         Objective     Pain:  Pain Assessment  Pain Assessment: 0-10  0-10 (Numeric) Pain Score: 5 - Moderate pain  Pain Type: Acute pain, Surgical pain  Pain Location: Hip  Pain Orientation: Left  Pain Interventions: Cold applied                        Treatments:  Therapeutic Exercise  Therapeutic Exercise Performed: Yes  Therapeutic Exercise Activity 1: Pt performed supine ther ex including ankle pumps, quad sets, glut sets, heel slides, AAROM hip ABD, SAQ 10 reps.  Therapeutic Exercise Activity 2: Pt performed seated ther ex  including LAQ, ABD/ADD pillow squeeze and Heel/toe raises 10 reps. Issued HEP for discharge. Verbal and tactile cues for technique and breathing.           Ambulation/Gait Training  Ambulation/Gait Training Performed: Yes  Ambulation/Gait Training 1  Surface 1: Level tile  Device 1: Rolling walker  Assistance 1: Close supervision  Quality of Gait 1: Inconsistent stride length, Decreased step length  Comments/Distance (ft) 1:  (Pt ambulates with WW  WBAT with slow uneven strides with step through gait lisa 75 ftx2 with close supervision.  V/c to point toes forward on L foot and look up and ahead staying more up inside the walker.)  Transfers  Transfer: Yes  Transfer 1  Technique 1: Sit to stand, Stand to sit  Transfer Device 1: Walker  Transfer Level of Assistance 1: Close supervision  Trials/Comments 1:  (Pt transfers with WW  WBAT with close supervision with v/c for proper hand placement/ technique.)  Stairs  Stairs: Yes  Stairs  Rails 1: Left  Device 1: Single point cane  Assistance 1: Close supervision  Comment/Number of Steps 1:  (Pt performs stair climbing with 1 HR and SPC with supervision.  Performed stair climbing using only 1 HR with supervision.  4 steps x2.)        Outcome Measures:  [facility] Basic Mobility  Turning from your back to your side while in a flat bed without using bedrails: None  Moving from lying on your back to sitting on the side of a flat bed without using bedrails: None  Moving to and from bed to chair (including a wheelchair): A little  Standing up from a chair using your arms (e.g. wheelchair or bedside chair): A little  To walk in hospital room: A little  Climbing 3-5 steps with railing: A little  Basic Mobility - Total Score: 20  Education Documentation  No documentation found.  Education Comments  No comments found.        EDUCATION:    Individual(s) Educated: Patient  Education Provided: Body Mechanics, Fall Risk, Home Exercise Program, Home Safety, Posture  Education Comment:  (Pt instructed in proper alignment of LLE in longsitting position and while performing exercises.)  Encounter Problems       Encounter Problems (Resolved)       PT Problem       Pt. will transfer supine/sit with MOD I (Adequate for Discharge)       Start:  08/12/24    Expected End:  08/15/24    Resolved:  08/13/24         Pt. will transfer sit/stand with FWW with MOD I (Adequate for Discharge)       Start:  08/12/24    Expected End:  08/15/24    Resolved:  08/13/24         Pt.will ambulate 100' with FWW with MOD I (Adequate for Discharge)       Start:  08/12/24    Expected End:  08/15/24    Resolved:  08/13/24         Pt. will amb up/down 2 steps with HR and cane with CGA  (Adequate for Discharge)       Start:  08/12/24    Expected End:  08/15/24    Resolved:  08/13/24         Pt. will perform 2 x 15 B LE AROM exercises  (Adequate for Discharge)       Start:  08/12/24    Expected End:  08/15/24    Resolved:  08/13/24

## 2024-08-13 NOTE — NURSING NOTE
Discharge instructions provided to pt. Pt understood instructions. Pt has meds to beds, IV removed, ride here. Pt in for transport. Pt stable.

## 2024-08-13 NOTE — DISCHARGE SUMMARY
Discharge summary  This patient Grecia Stephenson was admitted to the hospital on 8/12/2024  after undergoing Procedure(s) (LRB):  Arthroplasty Total Hip Robot-Assisted (Left) without complications that morning.    During the postoperative period,while in hospital, patient was medically managed by the hospitalist. Please see medial notes and H&P for patients additional diagnoses.  Ortho agrees with all medical diagnoses and treatments while patient in hospital.  No significant or unexpected findings or abnormal ortho imaging were noted during the hospital stay    Hospital course      Patient tolerated surgical procedure well and there was no complications. Patient progressed adequately through their recovery during hospital stay including PT and rehabilitation.    Patient was then D/C on 8/13/2024 to home  in stable condition.  Patient was instructed on the use of pain medications, the signs and symptoms of infection, and was given our number to call should they have any questions or concerns following discharge.    Based on my clinical judgment, the patient was provided with a 7-day prescription for opioid medication at 30 MED, indicated for treatment of acute pain in the setting of recent surgery. OARRS report was run and has demonstrated an appropriate time course.  The patient has been provided with counseling pertaining to safe use of opioid medication.    Patient may WBAT with no hip precautions to operative extremity with use of walker for assistance with ambulation   Mepilex dressing to be removed POD#7 and incision left open to air  Aspirin 81 mg BID for DVT prophylaxis started on 8/12/24 and to be taken for 30 days  Follow up with surgeon in 2 weeks

## 2024-08-14 ENCOUNTER — TELEPHONE (OUTPATIENT)
Dept: ORTHOPEDIC SURGERY | Facility: CLINIC | Age: 64
End: 2024-08-14
Payer: MEDICAID

## 2024-08-14 ENCOUNTER — HOME CARE VISIT (OUTPATIENT)
Dept: HOME HEALTH SERVICES | Facility: HOME HEALTH | Age: 64
End: 2024-08-14
Payer: MEDICAID

## 2024-08-14 VITALS
OXYGEN SATURATION: 95 % | SYSTOLIC BLOOD PRESSURE: 100 MMHG | DIASTOLIC BLOOD PRESSURE: 60 MMHG | TEMPERATURE: 98.9 F | HEART RATE: 110 BPM

## 2024-08-14 DIAGNOSIS — M16.12 PRIMARY OSTEOARTHRITIS OF LEFT HIP: Primary | ICD-10-CM

## 2024-08-14 PROCEDURE — G0151 HHCP-SERV OF PT,EA 15 MIN: HCPCS

## 2024-08-14 PROCEDURE — G0152 HHCP-SERV OF OT,EA 15 MIN: HCPCS | Performed by: OCCUPATIONAL THERAPIST

## 2024-08-14 RX ORDER — ONDANSETRON 4 MG/1
4 TABLET, FILM COATED ORAL EVERY 8 HOURS PRN
Qty: 20 TABLET | Refills: 0 | Status: SHIPPED | OUTPATIENT
Start: 2024-08-14 | End: 2024-08-14 | Stop reason: SDUPTHER

## 2024-08-14 RX ORDER — ONDANSETRON 4 MG/1
4 TABLET, FILM COATED ORAL EVERY 8 HOURS PRN
Qty: 20 TABLET | Refills: 0 | Status: SHIPPED | OUTPATIENT
Start: 2024-08-14 | End: 2024-08-21

## 2024-08-14 ASSESSMENT — ENCOUNTER SYMPTOMS
PERSON REPORTING PAIN: PATIENT
PAIN LOCATION: LEFT HIP
LOWEST PAIN SEVERITY IN PAST 24 HOURS: 3/10
SUBJECTIVE PAIN PROGRESSION: GRADUALLY IMPROVING
PAIN LOCATION - PAIN SEVERITY: 5/10
PAIN LOCATION: LEFT HIP
PAIN SEVERITY GOAL: 1/10
PERSON REPORTING PAIN: PATIENT
PAIN: 1
PAIN: 1
HIGHEST PAIN SEVERITY IN PAST 24 HOURS: 6/10
PAIN LOCATION - EXACERBATING FACTORS: MOVEMENT
PAIN LOCATION - PAIN QUALITY: TIGHTNESS

## 2024-08-14 ASSESSMENT — ACTIVITIES OF DAILY LIVING (ADL)
TOILETING: 1
AMBULATION ASSISTANCE ON FLAT SURFACES: 1
PHYSICAL TRANSFERS ASSESSED: 1
ENTERING_EXITING_HOME: ONE PERSON
DRESSING_UB_CURRENT_FUNCTION: SUPERVISION
BATHING_CURRENT_FUNCTION: CONTACT GUARD ASSIST
AMBULATION ASSISTANCE: 1
TOILETING: SUPERVISION
CURRENT_FUNCTION: STAND BY ASSIST
AMBULATION ASSISTANCE: SUPERVISION
OASIS_M1830: 03
DRESSING_LB_CURRENT_FUNCTION: CONTACT GUARD ASSIST
BATHING ASSESSED: 1

## 2024-08-15 NOTE — TELEPHONE ENCOUNTER
Physical therapist from home contacted me about this patient in regards to postoperative nausea that she was having.  The patient is requesting something to help with her nausea with her pain medication.  We went ahead and sent over a prescription for Zofran 4 mg 1 p.o. every 8 as needed.

## 2024-08-16 ENCOUNTER — HOME CARE VISIT (OUTPATIENT)
Dept: HOME HEALTH SERVICES | Facility: HOME HEALTH | Age: 64
End: 2024-08-16
Payer: MEDICAID

## 2024-08-16 VITALS — HEART RATE: 67 BPM | OXYGEN SATURATION: 98 % | SYSTOLIC BLOOD PRESSURE: 102 MMHG | DIASTOLIC BLOOD PRESSURE: 50 MMHG

## 2024-08-16 PROCEDURE — G0157 HHC PT ASSISTANT EA 15: HCPCS | Mod: CQ

## 2024-08-16 SDOH — HEALTH STABILITY: PHYSICAL HEALTH
EXERCISE COMMENTS: COMPLETED SUPINE AP, QS, GS, HS, SAQ, HAMSTRING SETS, HIP ROTATION WITH REINFORCEMENT/TACTILE CUES FOR ALIGNMENT AND PACING, BREATHING OUT WITH EXERTION 15X.

## 2024-08-16 ASSESSMENT — ENCOUNTER SYMPTOMS
PAIN LOCATION: LEFT HIP
HIGHEST PAIN SEVERITY IN PAST 24 HOURS: 3/10
PAIN LOCATION - PAIN SEVERITY: 2/10
PERSON REPORTING PAIN: PATIENT
PAIN: 1

## 2024-08-21 ENCOUNTER — HOME CARE VISIT (OUTPATIENT)
Dept: HOME HEALTH SERVICES | Facility: HOME HEALTH | Age: 64
End: 2024-08-21
Payer: MEDICAID

## 2024-08-21 ENCOUNTER — APPOINTMENT (OUTPATIENT)
Dept: HOME HEALTH SERVICES | Facility: HOME HEALTH | Age: 64
End: 2024-08-21
Payer: MEDICAID

## 2024-08-22 ENCOUNTER — APPOINTMENT (OUTPATIENT)
Dept: HOME HEALTH SERVICES | Facility: HOME HEALTH | Age: 64
End: 2024-08-22
Payer: MEDICAID

## 2024-08-22 VITALS — SYSTOLIC BLOOD PRESSURE: 141 MMHG | DIASTOLIC BLOOD PRESSURE: 80 MMHG | HEART RATE: 85 BPM | OXYGEN SATURATION: 98 %

## 2024-08-22 PROCEDURE — G0151 HHCP-SERV OF PT,EA 15 MIN: HCPCS

## 2024-08-22 ASSESSMENT — ACTIVITIES OF DAILY LIVING (ADL)
OASIS_M1830: 00
HOME_HEALTH_OASIS: 00

## 2024-08-22 ASSESSMENT — ENCOUNTER SYMPTOMS
PAIN LOCATION - PAIN SEVERITY: 1/10
PAIN LOCATION: LEFT HIP
PERSON REPORTING PAIN: PATIENT
PAIN: 1

## 2024-08-23 NOTE — PROGRESS NOTES
Physical Therapy  Physical Therapy Evaluation    Patient Name: Grecia Stephenson  MRN: [id number]  Today's Date: 8/27/2024  Time Calculation  Start Time: 0239  Stop Time: 0316  Time Calculation (min): 37 min  PT Evaluation Time Entry  PT Evaluation (Low) Time Entry: 25  PT Therapeutic Procedures Time Entry  Therapeutic Exercise Time Entry: 10                   Insurance:  COPAY 0 DED 0 COVERAGE 100 OOP 0   Visit number: 2 of 30  Authorization info: 30V THEN AUTH REQ  Insurance Type: [employer]    General:  Reason for visit: s/p L ZINA 8/12/24  Referred by: Blanche    Current Problem:  1. Pain of left hip  Follow Up In Physical Therapy        Precautions: per MR  Precautions  Precautions Comment: Per MR    Medical History Form: Reviewed (scanned into chart)    Subjective:   Pt reports doing well, but wants to get back to work.  Current Condition:   Better    Pain:  Pain Assessment: 0-10  0-10 (Numeric) Pain Score: 4  Pain Location: Hip  Pain Orientation: Left      Previous Interventions/Treatments: Physical Therapy-[facility]    Prior Level of Function (PLOF)  Patient previously independent with all ADLs  Exercise/Physical Activity: NA  Work/School: PT     Patients Living Environment: Reviewed and no concern    Primary Language: English     Patient's Goal(s) for Therapy: not stated    Red Flags: Do you have any of the following? No  Fever/chills, unexplained weight changes, dizziness/fainting, unexplained change in bowel or bladder functions, unexplained malaise or muscle weakness, night pain/sweats, numbness or tingling    Objective:      Left Hip AROM  * indicates Pain  FL 90  EX 0  IR  20  ER  20  AB 30  AD 10     Left Hip Strength (0/5)  * indicates Pain  FL 4  EX 4+  IR 4-  ER 4-  AB 4  AD 4  Posture: WNL    Lower Extremity Functional Movements  Transfers: Ind  Gait: Ind      Outcome Measures:    Other Measures  Lower Extremity Funtional Score (LEFS): 59     EDUCATION: Pt educated in [facility],  PT POC, anatomy, activity avoidance, proper positioning/posture , pt demonstrates understanding of PT info, all questions answered.        Goals: Set and discussed today  Increase ROM to WFL of L hip  Increase Strength where deficits noted by 1/3 to 1 mm grade of L hip  Ind w/ HEP to expedite progress and promote goal achievement.    Improve Outcome score  for evidence of improved function.  Return to PLOF   Decrease pain to 2/10 or less      Plan of care was developed with input and agreement by the patient.    Treatment Performed:    Therapeutic Exercise:    10 min  Bridges 3x10  QS/GS 15x10s  SLR 5x  LTR 20x  Hooklying Ball Sqz/Belt push 10x10s      Assessment: 63 y/o F presents with c/o L TKA. Upon examination patient demonstrates impaired gait limiting overall functional mobility including work duties.  Pt to benefit from outpatient PT to address deficits, maximize functional mobility and improve QOL.  The clinical presentation of this patient is stable and their history and examination findings are consistent with a low complexity evaluation with good rehab potential.        Plan:     Planned Interventions include: therapeutic exercise, self-care home management, manual therapy, therapeutic activities, gait training, neuromuscular coordination, vasopneumatic, dry needling, aquatic therapy and modalities PRN.  Frequency: 2x/wk  Duration: 4weeks      Giorgio Goodwin PT

## 2024-08-27 ENCOUNTER — HOSPITAL ENCOUNTER (OUTPATIENT)
Dept: RADIOLOGY | Facility: CLINIC | Age: 64
Discharge: HOME | End: 2024-08-27
Payer: MEDICAID

## 2024-08-27 ENCOUNTER — OFFICE VISIT (OUTPATIENT)
Dept: ORTHOPEDIC SURGERY | Facility: CLINIC | Age: 64
End: 2024-08-27
Payer: MEDICAID

## 2024-08-27 ENCOUNTER — EVALUATION (OUTPATIENT)
Dept: PHYSICAL THERAPY | Facility: CLINIC | Age: 64
End: 2024-08-27
Payer: MEDICAID

## 2024-08-27 DIAGNOSIS — Z47.1 AFTERCARE FOLLOWING LEFT HIP JOINT REPLACEMENT SURGERY: Primary | ICD-10-CM

## 2024-08-27 DIAGNOSIS — Z47.1 AFTERCARE FOLLOWING LEFT HIP JOINT REPLACEMENT SURGERY: ICD-10-CM

## 2024-08-27 DIAGNOSIS — Z96.642 AFTERCARE FOLLOWING LEFT HIP JOINT REPLACEMENT SURGERY: Primary | ICD-10-CM

## 2024-08-27 DIAGNOSIS — M25.552 PAIN OF LEFT HIP: Primary | ICD-10-CM

## 2024-08-27 DIAGNOSIS — Z96.642 AFTERCARE FOLLOWING LEFT HIP JOINT REPLACEMENT SURGERY: ICD-10-CM

## 2024-08-27 PROCEDURE — 97110 THERAPEUTIC EXERCISES: CPT | Mod: GP | Performed by: PHYSICAL THERAPIST

## 2024-08-27 PROCEDURE — 99024 POSTOP FOLLOW-UP VISIT: CPT | Performed by: ORTHOPAEDIC SURGERY

## 2024-08-27 PROCEDURE — 73502 X-RAY EXAM HIP UNI 2-3 VIEWS: CPT | Mod: LEFT SIDE | Performed by: ORTHOPAEDIC SURGERY

## 2024-08-27 PROCEDURE — 97161 PT EVAL LOW COMPLEX 20 MIN: CPT | Mod: GP | Performed by: PHYSICAL THERAPIST

## 2024-08-27 PROCEDURE — 73502 X-RAY EXAM HIP UNI 2-3 VIEWS: CPT | Mod: LT

## 2024-08-27 PROCEDURE — 99211 OFF/OP EST MAY X REQ PHY/QHP: CPT | Performed by: ORTHOPAEDIC SURGERY

## 2024-08-27 PROCEDURE — 1036F TOBACCO NON-USER: CPT | Performed by: ORTHOPAEDIC SURGERY

## 2024-08-27 ASSESSMENT — PAIN SCALES - GENERAL: PAINLEVEL_OUTOF10: 4

## 2024-08-27 ASSESSMENT — PAIN - FUNCTIONAL ASSESSMENT: PAIN_FUNCTIONAL_ASSESSMENT: 0-10

## 2024-08-28 NOTE — PROGRESS NOTES
History of present illness    64-year-old female follow-up of her left total hip replacement surgery August 12 she states doing very well using just a cane for assistive ice no fevers or chills no numbness or tingling no chest pain or shortness of breath no longer taking her pain medication      Past medical , Surgical, Family and social history reviewed.      Physical exam  General: No acute distress and breathing comfortably.  Patient is pleasant and cooperative with the examination.    Extremity  Hip incision well-healed no sign infection neurologically intact distally brisk cap refill compartments soft calf is nontender no redness or drainage    Diagnostics      XR hip left with pelvis when performed 2 or 3 views    Result Date: 8/27/2024  Interpreted By:  Cuauhtemoc Mancia, STUDY: XR HIP LEFT WITH PELVIS WHEN PERFORMED 2 OR 3 VIEWS; 8/27/2024 3:49 pm   INDICATION: Signs/Symptoms:pain.   ACCESSION NUMBER(S): [id number]   ORDERING CLINICIAN: CUAUHTEMOC MANCIA   FINDINGS: Two views show patient status post total hip replacement in good alignment.  No signs of fracture dislocation or other bony abnormality       Signed by: Cuauhtemoc Mancia 8/27/2024 4:30 PM Dictation workstation:   [id number]    XR hip left with pelvis when performed 1 view    Result Date: 8/12/2024  Interpreted By:  Isaura Pope, STUDY: XR HIP LEFT WITH PELVIS WHEN PERFORMED 1 VIEW;  8/12/2024 11:15 am   INDICATION: Signs/Symptoms:Post op hip.   COMPARISON: 05/01/2024   ACCESSION NUMBER(S): [id number]   ORDERING CLINICIAN: CUAUHTEMOC MANCIA   TECHNIQUE: A single portable image of the left hip was obtained.   FINDINGS: The patient is status post total left hip replacement. The hardware appears intact.   COMPARISON OF FINDINGS: The surgery was performed in the interval.       Status post total left hip replacement.     MACRO: none   Signed by: Isaura Pope 8/12/2024 12:27 PM Dictation workstation:   [id number]    ECG 12  Lead    Result Date: 7/31/2024  Sinus bradycardia Otherwise normal ECG When compared with ECG of 07-OCT-2012 09:43, Vent. rate has decreased BY  33 BPM T wave inversion no longer evident in Anterior leads QT has shortened Confirmed by Stan Mullen ([id number]) on 7/31/2024 10:30:27 AM    XR chest 2 views    Result Date: 7/30/2024  Interpreted By:  Caro Holm, STUDY: XR CHEST 2 VIEWS 7/29/2024 11:15 am   INDICATION: Signs/Symptoms:z00.01. History of tobacco abuse.   COMPARISON: 10/05/2012   ACCESSION NUMBER(S): [id number]   ORDERING CLINICIAN: JENNIFER ABRAMS   TECHNIQUE: PA and lateral views of the chest were acquired.   FINDINGS: Heart and mediastinum are normal in appearance. The lungs are clear without pleural abnormality. There are degenerative changes of the thoracic spine.       No acute cardiopulmonary disease.   Signed by: Caro Holm 7/30/2024 7:39 PM Dictation workstation:   [id number]    CT hip left wo IV contrast    Result Date: 7/29/2024  These images are not reportable by radiology and will not be interpreted by  Radiologists.       Procedure  Sutures are removed Steri-Strips applied she is instructed in wound care    Assessment  Status post left total hip replacement    Treatment plan  1.  Continue work on range of motion strengthening continue with her physical therapy follow-up with me in 3 weeks sooner if she has increased pain or discomfort.  2. [   ]  3. [   ]  4.  All of the patient's questions were answered.    Orders Placed This Encounter    XR hip left with pelvis when performed 2 or 3 views       This note was prepared using voice recognition software.  The details of this note are correct and have been reviewed, and corrected to the best of my ability.  Some grammatical areas may persist related to the Dragon software    Steven Mancia MD  Senior Attending Physician  [facility]  [facility] [place]    [phone]

## 2024-08-29 ENCOUNTER — TREATMENT (OUTPATIENT)
Dept: PHYSICAL THERAPY | Facility: CLINIC | Age: 64
End: 2024-08-29
Payer: MEDICAID

## 2024-08-29 DIAGNOSIS — M25.552 PAIN OF LEFT HIP: ICD-10-CM

## 2024-08-29 PROCEDURE — 97110 THERAPEUTIC EXERCISES: CPT | Mod: GP,CQ

## 2024-08-29 ASSESSMENT — PAIN - FUNCTIONAL ASSESSMENT: PAIN_FUNCTIONAL_ASSESSMENT: 0-10

## 2024-08-29 ASSESSMENT — PAIN SCALES - GENERAL: PAINLEVEL_OUTOF10: 5 - MODERATE PAIN

## 2024-08-29 NOTE — PROGRESS NOTES
Physical Therapy Treatment    Patient Name: Grecia Stephenson  MRN: [id number]  Today's Date: 8/29/2024  Time Calculation  Start Time: 1130  Stop Time: 1154  Time Calculation (min): 24 min  PT Therapeutic Procedures Time Entry  Therapeutic Exercise Time Entry: 23       Current Problem  1. Pain of left hip  Follow Up In Physical Therapy          Subjective   General   Pt stating quite a bit of soreness today after having to do a lot of stairs yesterday.  Insurance   COPAY 0 DED 0 COVERAGE 100 OOP 0   Visit number: 3 of 30  Authorization info: 30V THEN AUTH REQ  Insurance Type: [employer]  Precautions     Pain  Pain Assessment: 0-10  0-10 (Numeric) Pain Score: 5 - Moderate pain  Pain Type: Surgical pain  Pain Location: Hip  Pain Orientation: Left    Objective   Treatments:  Supine marches 2x10  SLR flex x15  Bridges 2x10  Supine clamshells RTB 2x10  LAQ 2x10  STS hi-lo 2x10  HR/TR 2x10    Assessment   Pt ambulates into clinic antalgic gait with SPC. Very limited in tolerance to activity secondary to soreness entering clinic. Able to complete ex's for some ROM and mobility through L hip with reduced intensity. Encouraged rest ice over the weekend and return to HEP as tolerated following.   Plan:  Progress with POC as tolerated.    OP EDUCATION:       Goals:

## 2024-09-10 ENCOUNTER — TREATMENT (OUTPATIENT)
Dept: PHYSICAL THERAPY | Facility: CLINIC | Age: 64
End: 2024-09-10
Payer: MEDICAID

## 2024-09-10 DIAGNOSIS — M25.552 PAIN OF LEFT HIP: ICD-10-CM

## 2024-09-10 PROCEDURE — 97110 THERAPEUTIC EXERCISES: CPT | Mod: GP,CQ

## 2024-09-10 NOTE — PROGRESS NOTES
"Physical Therapy Treatment    Patient Name: Grecia Stephenson  MRN: [id number]  Today's Date: 9/13/2024  Time Calculation  Start Time: 0144  Stop Time: 0226  Time Calculation (min): 42 min  PT Therapeutic Procedures Time Entry  Therapeutic Exercise Time Entry: 42       Insurance   COPAY 0 DED 0 COVERAGE 100 OOP 0   Visit number: 3 of 8  Authorization info: 30V THEN AUTH REQ  Insurance Type: [employer]    Current Problem  1. Pain of left hip  Follow Up In Physical Therapy            Subjective   General  General Comment: MAKOPt w/ cont'd c/o clicking in hip and knee when amb w/o AD.  Also says she has trouble putting socks and shoes on.  Precautions  Precautions  Precautions Comment: Per MR  Pain  Pain Assessment: 0-10  0-10 (Numeric) Pain Score: 2  Pain Type: Deep somatic pain  Pain Location: Hip  Pain Orientation: Left    Objective   Pt seen to have L lumbar scoliosis on X-ray for hip  Treatments:   Carlos A 5'  Supine marches RTB 20x   Hooklying Hip Ab RTB 20x  SLR flex 2x10---  Bridges  2x10  Supine LLE X-over RLE for Hip Stretch 10x10s    NT---  Supine clamshells RTB 3\" 2x10  LAQ 2# 2x10  STS hi-lo 2x10  HR/TR 2x10  Stand hip 3-way RTB x20  Step ups F/L 6\" x20  Step overs 6\" x20  Tapdowns 4\" x20    Gait w/ cane and w/o cane - 3'    Assessment:   Pt's lumbar spine warrants further examination d/t to noted curve.  Needs more stretching for AROM. This curve is impacting her abilities to perform routine ZINA exs.  Pt's exs modified today to minimize LB effects.  Pt tolerated session well this date.     Plan:   Continue w/ improving gait and increasing strength to address dysfunction as noted.     OP EDUCATION:   Access Code: [email or web address]    Goals:     "

## 2024-09-10 NOTE — PROGRESS NOTES
"Physical Therapy Treatment    Patient Name: Grecia Stephenson  MRN: [id number]  Today's Date: 9/10/2024  Time Calculation  Start Time: 1315  Stop Time: 1400  Time Calculation (min): 45 min  PT Therapeutic Procedures Time Entry  Therapeutic Exercise Time Entry: 40  Gait Training Time Entry: 3       Insurance   COPAY 0 DED 0 COVERAGE 100 OOP 0   Visit number: 4 of 30  Authorization info: 30V THEN AUTH REQ  Insurance Type: [employer]    Current Problem  1. Pain of left hip  Follow Up In Physical Therapy          Subjective   General   Pt. Reports she is feeling a little better coming in today, but is getting some clicking still throughout her LE while ambulating w/o the cane.   Precautions     Pain       Objective   Treatments:   Carlos A 6'  Supine marches RTB 2x10  SLR flex 2x10  Bridges 3\" 2x10  Supine clamshells RTB 3\" 2x10  LAQ 2# 2x10  STS hi-lo 2x10  HR/TR 2x10  Stand hip 3-way RTB x20  Step ups F/L 6\" x20  Step overs 6\" x20  Tapdowns 4\" x20    Gait w/ cane and w/o cane - 3'    Assessment:   Added several new exercises for increasing strength/endurance w/ good pt. Tolerance. Pt. Required verbal and visual cueing for proper technique w/ good carryover. Educated pt. On proper technique for ambulation w/o the cane w/ good carryover. Pt. Given updated HEP.     Plan:   Continue w/ improving gait and increasing strength.     OP EDUCATION:   Access Code: [email or web address]    Goals:     "

## 2024-09-13 ENCOUNTER — TREATMENT (OUTPATIENT)
Dept: PHYSICAL THERAPY | Facility: CLINIC | Age: 64
End: 2024-09-13
Payer: MEDICAID

## 2024-09-13 DIAGNOSIS — M25.552 PAIN OF LEFT HIP: ICD-10-CM

## 2024-09-13 PROCEDURE — 97110 THERAPEUTIC EXERCISES: CPT | Mod: GP | Performed by: PHYSICAL THERAPIST

## 2024-09-13 ASSESSMENT — PAIN SCALES - GENERAL: PAINLEVEL_OUTOF10: 2

## 2024-09-13 ASSESSMENT — PAIN - FUNCTIONAL ASSESSMENT: PAIN_FUNCTIONAL_ASSESSMENT: 0-10

## 2024-09-16 ENCOUNTER — TREATMENT (OUTPATIENT)
Dept: PHYSICAL THERAPY | Facility: CLINIC | Age: 64
End: 2024-09-16
Payer: MEDICAID

## 2024-09-16 DIAGNOSIS — M25.552 PAIN OF LEFT HIP: ICD-10-CM

## 2024-09-16 PROCEDURE — 97110 THERAPEUTIC EXERCISES: CPT | Mod: GP | Performed by: PHYSICAL THERAPIST

## 2024-09-16 ASSESSMENT — PAIN - FUNCTIONAL ASSESSMENT: PAIN_FUNCTIONAL_ASSESSMENT: 0-10

## 2024-09-16 ASSESSMENT — PAIN SCALES - GENERAL: PAINLEVEL_OUTOF10: 0 - NO PAIN

## 2024-09-16 ASSESSMENT — PAIN DESCRIPTION - DESCRIPTORS: DESCRIPTORS: TIGHTNESS

## 2024-09-16 NOTE — PROGRESS NOTES
"Physical Therapy Treatment    Patient Name: Grecia Stephenson  MRN: [id number]  Today's Date: 9/16/2024  Time Calculation  Start Time: 0231  Stop Time: 0311  Time Calculation (min): 40 min  PT Therapeutic Procedures Time Entry  Therapeutic Exercise Time Entry: 40       Insurance   COPAY 0 DED 0 COVERAGE 100 OOP 0   Visit number: 4 of 8  Authorization info: 30V THEN AUTH REQ  Insurance Type: [employer]    Current Problem  1. Pain of left hip  Follow Up In Physical Therapy              Subjective   General  General Comment: MAKOPt notes less clicking than yesterday.  Precautions  Precautions  Precautions Comment: Per MR  Pain  Pain Assessment: 0-10  0-10 (Numeric) Pain Score: 0 - No pain  Pain Location: Hip  Pain Orientation: Left  Pain Descriptors: Tightness    Objective     Treatments:   Carlos A 6'  Supine marches RTB 20x   Supine ISO Hip Flexion 10x10s L only  Hooklying Hip Ab RTB 20x  SKC stretch L 5x30s  GS 10x10s  SL Hip AB/AD 2x10 ea  Prone Hip Ex 2x10  SLR flex 2x10---  Bridges  2x10  LTR 20x  Supine LLE X-over RLE for Hip Stretch 5x30s  Seated Ball Sqz/Belt push 10x10s ea    NT---  Supine clamshells RTB 3\" 2x10  LAQ 2# 2x10  STS hi-lo 2x10  HR/TR 2x10  Stand hip 3-way RTB x20  Step ups F/L 6\" x20  Step overs 6\" x20  Tapdowns 4\" x20    Gait w/ cane and w/o cane - 3'    Assessment:  Pt able to tolerate tx session well this date w/ no adverse effects noted from tx.  Pt compliant w/ program and appears to understand rationale for therapy.  Still requires skilled therapy for increasing strength/ROM for performing ADLs.      Plan:  D/t noted impairments and dysfunction of  L hip, PT will cont to address deficits of strength and ROM via therapeutic exs and modalities PRN and further assist w/ pain reduction.      dysfunction as noted.     OP EDUCATION:   Access Code: [email or web address]    Goals:     "

## 2024-09-19 ENCOUNTER — TREATMENT (OUTPATIENT)
Dept: PHYSICAL THERAPY | Facility: CLINIC | Age: 64
End: 2024-09-19
Payer: MEDICAID

## 2024-09-19 DIAGNOSIS — M25.552 PAIN OF LEFT HIP: Primary | ICD-10-CM

## 2024-09-19 PROCEDURE — 97110 THERAPEUTIC EXERCISES: CPT | Mod: GP,CQ

## 2024-09-19 ASSESSMENT — PAIN DESCRIPTION - DESCRIPTORS: DESCRIPTORS: ACHING

## 2024-09-19 ASSESSMENT — PAIN SCALES - GENERAL: PAINLEVEL_OUTOF10: 4

## 2024-09-19 ASSESSMENT — PAIN - FUNCTIONAL ASSESSMENT: PAIN_FUNCTIONAL_ASSESSMENT: 0-10

## 2024-09-19 NOTE — PROGRESS NOTES
"Physical Therapy Treatment    Patient Name: Grecia Stephenson  MRN: [id number]  Today's Date: 9/19/2024  Time Calculation  Start Time: 0203  Stop Time: 0241  Time Calculation (min): 38 min     PT Therapeutic Procedures Time Entry  Therapeutic Exercise Time Entry: 38                 Insurance:   COPAY 0 DED 0 COVERAGE 100 OOP 0   Visit number: 5 of 8  Authorization info: 30V THEN AUTH REQ  Insurance Type: [employer]  Assessment:   Cont'd w/ ex's as documented w/emphasis on control and pacing. Pt making nice progress however cont's to report tightness through L hip and along L groin/adductors. She had the most tightness w/ Piriformis Stretch. Overall good ability to complete remaining ex's. No new ex's added dt pt returning to work tomorrow.     Plan:   D/t noted impairments and dysfunction of  L hip, PT will cont to address deficits of strength and ROM via therapeutic exs and modalities PRN and further assist w/ pain reduction. MD suazo Tue 9/24 following PT appt. (Dr. Mancia) Pt to RTW tomorrow and Sat. Discuss symptoms following return to work at next appt.     Current Problem  1. Pain of left hip  Follow Up In Physical Therapy          Subjective   General  General Comment: PIYUSH Pt states she was sore after the last PT session but is working hard to go back to work, which she plans to do tomorrow. Pt has more L shin pain than hip pain. States the hip is more \"tight\" than anything.   Precautions  Precautions  Precautions Comment: Per MR    Pain  Pain Assessment: 0-10  0-10 (Numeric) Pain Score: 4  Pain Location: Leg (Shin)  Pain Orientation: Left  Pain Descriptors: Aching    Objective       Treatments:  Therapeutic Exercise ([id number]):   Carlos A 6'  Supine marches RTB 20x   Supine ISO Hip Flexion 10x10s L only  Hooklying Hip Ab RTB 20x  SKC stretch L 5x30s  GS 10x10s  SL Hip AB/AD 2x10 ea  Prone Hip Ex 2x10  SLR flex 2x10---  Bridges  RTB 2x10  LTR 20x  Supine LLE X-over RLE for Hip Stretch " "3x30s  Seated Ball Sqz/Belt push 10x10s ea  LAQ 2# 2x10    NT---  Supine clamshells RTB 3\" 2x10  STS hi-lo 2x10  HR/TR 2x10  Stand hip 3-way RTB x20  Step ups F/L 6\" x20  Step overs 6\" x20  Tapdowns 4\" x20     Gait w/ cane and w/o cane - 3'       EDUCATION:         "

## 2024-09-24 ENCOUNTER — OFFICE VISIT (OUTPATIENT)
Dept: ORTHOPEDIC SURGERY | Facility: CLINIC | Age: 64
End: 2024-09-24
Payer: MEDICAID

## 2024-09-24 ENCOUNTER — TREATMENT (OUTPATIENT)
Dept: PHYSICAL THERAPY | Facility: CLINIC | Age: 64
End: 2024-09-24
Payer: MEDICAID

## 2024-09-24 DIAGNOSIS — Z96.642 AFTERCARE FOLLOWING LEFT HIP JOINT REPLACEMENT SURGERY: Primary | ICD-10-CM

## 2024-09-24 DIAGNOSIS — M25.552 PAIN OF LEFT HIP: ICD-10-CM

## 2024-09-24 DIAGNOSIS — Z47.1 AFTERCARE FOLLOWING LEFT HIP JOINT REPLACEMENT SURGERY: Primary | ICD-10-CM

## 2024-09-24 PROCEDURE — 99024 POSTOP FOLLOW-UP VISIT: CPT | Performed by: ORTHOPAEDIC SURGERY

## 2024-09-24 PROCEDURE — 99211 OFF/OP EST MAY X REQ PHY/QHP: CPT | Performed by: ORTHOPAEDIC SURGERY

## 2024-09-24 PROCEDURE — 97110 THERAPEUTIC EXERCISES: CPT | Mod: GP,CQ

## 2024-09-24 PROCEDURE — 1036F TOBACCO NON-USER: CPT | Performed by: ORTHOPAEDIC SURGERY

## 2024-09-24 NOTE — PROGRESS NOTES
"Physical Therapy Treatment    Patient Name: Grecia Stephenson  MRN: [id number]  Today's Date: 9/24/2024  Time Calculation  Start Time: 1330  Stop Time: 1410  Time Calculation (min): 40 min  PT Therapeutic Procedures Time Entry  Therapeutic Exercise Time Entry: 38       Insurance:   COPAY 0 DED 0 COVERAGE 100 OOP 0   Visit number: 6 of 8  Authorization info: 30V THEN AUTH REQ  Insurance Type: [employer]    Current Problem  1. Pain of left hip  Follow Up In Physical Therapy          Subjective   General   Pt. Reports she has been walking w/o her cane and is healing well overall, just not as fast as she would like.   Precautions     Pain       Objective   Treatments:    Carlos A 6'  Supine marches RTB 20x   Hooklying Hip Ab RTB 20x  SKC stretch L 5x30s  SL Hip AB/AD 2x10 ea  SLR flex/abd 2x10  Bridges  RTB 2x10  LTR 20x5\"  LAQ 2# 2x10  STS hi-lo 2x10  Step ups F/L 6\" x20  Step overs 6\" x20  HR/TR 2x10     NT---  Stand hip 3-way RTB x20  Tapdowns 4\" x20    Assessment:   Pt. Progressing w/ all exercises well. Pt. Required verbal cueing for keeping her knees apart while performing STS w/ good carryover. Pt. Did appear to have increased fatigue after treatment today and will continue to benefit from skilled PT for increasing strength/endurance.     Plan:   Continue to increase strength for returning to PLOF.     OP EDUCATION:       Goals:     "

## 2024-09-24 NOTE — PROGRESS NOTES
64-year-old female here for follow-up of her left total hip replacement from August 12 she states doing very well she gets some soreness in her thigh at times when she stands for prolonged period time no numbness or tingling or fevers or chills no redness or drainage she is ambulating without assistive device                    History of present illness    64-year-old female here for follow-up of her left total hip replacement from August 12 she states doing very well she gets some soreness in her thigh at times when she stands for prolonged period time no numbness or tingling or fevers or chills no redness or drainage she is ambulating without assistive device      Past medical , Surgical, Family and social history reviewed.      Physical exam  General: No acute distress and breathing comfortably.  Patient is pleasant and cooperative with the examination.    Extremity  Hip incisions well-healed without infection good range of motion neurologically intact distally brisk cap refill compartments soft calves nontender    Diagnostics      XR hip left with pelvis when performed 2 or 3 views    Result Date: 8/27/2024  Interpreted By:  Cuauhtemoc Mancia, STUDY: XR HIP LEFT WITH PELVIS WHEN PERFORMED 2 OR 3 VIEWS; 8/27/2024 3:49 pm   INDICATION: Signs/Symptoms:pain.   ACCESSION NUMBER(S): [id number]   ORDERING CLINICIAN: CUAUHTEMOC MANCIA   FINDINGS: Two views show patient status post total hip replacement in good alignment.  No signs of fracture dislocation or other bony abnormality       Signed by: Cuauhtemoc Mancia 8/27/2024 4:30 PM Dictation workstation:   [id number]       Procedure  [ none]    Assessment  Status post left total hip replacement    Treatment plan  1.  Continue with her physical therapy new referral provided today continue to weight-bear as tolerated follow-up with me in 1 month repeat evaluation with x-rays sooner if she has increased pain or discomfort.  2. [   ]  3. [   ]  4.  All of the  patient's questions were answered.    Orders Placed This Encounter    Referral to Physical Therapy       This note was prepared using voice recognition software.  The details of this note are correct and have been reviewed, and corrected to the best of my ability.  Some grammatical areas may persist related to the Dragon software    Steven Mancia MD  Senior Attending Physician  [facility]  Orthopedic [place]    [phone]

## 2024-09-26 ENCOUNTER — TREATMENT (OUTPATIENT)
Dept: PHYSICAL THERAPY | Facility: CLINIC | Age: 64
End: 2024-09-26
Payer: MEDICAID

## 2024-09-26 DIAGNOSIS — M25.552 PAIN OF LEFT HIP: ICD-10-CM

## 2024-09-26 PROCEDURE — 97110 THERAPEUTIC EXERCISES: CPT | Mod: GP | Performed by: PHYSICAL THERAPIST

## 2024-09-26 ASSESSMENT — PAIN - FUNCTIONAL ASSESSMENT: PAIN_FUNCTIONAL_ASSESSMENT: 0-10

## 2024-09-26 ASSESSMENT — PAIN SCALES - GENERAL: PAINLEVEL_OUTOF10: 5 - MODERATE PAIN

## 2024-09-26 ASSESSMENT — PAIN DESCRIPTION - DESCRIPTORS: DESCRIPTORS: SORE

## 2024-09-26 NOTE — PROGRESS NOTES
"Physical Therapy Treatment    Patient Name: Grecia Stephenson  MRN: [id number]  Today's Date: 9/26/2024  Time Calculation  Start Time: 0154  Stop Time: 0229  Time Calculation (min): 35 min  PT Therapeutic Procedures Time Entry  Therapeutic Exercise Time Entry: 35       Insurance:   COPAY 0 DED 0 COVERAGE 100 OOP 0   Visit number: 7 of 8  Authorization info: 30V THEN AUTH REQ  Insurance Type: [employer]    Current Problem  1. Pain of left hip  Follow Up In Physical Therapy          Subjective   General  General Comment: Nancy feeling very sore, recently returned to work.   Precautions  Precautions  Precautions Comment: Per MR  Pain  Pain Assessment: 0-10  0-10 (Numeric) Pain Score: 5 - Moderate pain  Pain Location: Hip  Pain Orientation: Left  Pain Descriptors: Sore    Objective   Treatments:  QS 15x10s  GS 15x10s  Bridges 3x10  Hooklying Clamshell YTB 3x10  Hooklying Hip Ab YTB 15x10s  Hooklying Ball Sqz 15x10s  Supine marches RTB 20x (no TB today)  LTR 20x5\" (no hold today)  LAQ 2# 3x10 (no wt today)  Seated R hip flexion ISO 10x10s to have Left hip stabilize.    NT---Resume as able    Carlos A 6'    Hooklying Hip Ab RTB 20x  SKC stretch L 5x30s  SL Hip AB/AD 2x10 ea  SLR flex/abd 2x10  Bridges  RTB 2x10  STS hi-lo 2x10  Step ups F/L 6\" x20  Step overs 6\" x20  HR/TR 2x10     NT---  Stand hip 3-way RTB x20  Tapdowns 4\" x20    Assessment:  Tx needed to be modified d/t to pt's exacerbation of sx's  from returning to work.  Pt able to tolerate tx session well this date w/ no adverse effects noted from tx.  Still requires skilled therapy for increasing strength/ROM for performing ADLs.      Plan:  D/t noted impairments and dysfunction of  L hip, PT will cont to address deficits of strength and ROM via therapeutic exs and modalities PRN and further assist w/ pain reduction.  Pt to continue 2x/wk for 4 weeks and schedule visits today.          OP EDUCATION:       Goals:     "

## 2024-09-27 NOTE — PROGRESS NOTES
"Physical Therapy Treatment    Patient Name: Grecia Stephenson  MRN: [id number]  Today's Date: 10/1/2024  Time Calculation  Start Time: 0107  Stop Time: 0146  Time Calculation (min): 39 min  PT Therapeutic Procedures Time Entry  Therapeutic Exercise Time Entry: 39       Insurance:   COPAY 0 DED 0 COVERAGE 100 OOP 0   Visit number: 8 of 8 (new POC added  8 more visits-next visit is 1 of 8)  Authorization info: 30V THEN AUTH REQ  Insurance Type: [employer]    Current Problem  1. Pain of left hip  Follow Up In Physical Therapy            Subjective   General  General Comment: Nancy states she is feeling much better since last visit and able to get around w/ less pain.  Precautions  Precautions  Precautions Comment: Per MR  Pain  Pain Assessment: 0-10  0-10 (Numeric) Pain Score: 0 - No pain  Pain Location: Hip  Pain Orientation: Left    Objective   Treatments:  FRIEDA 6'  Bridges  RTB 3x10  Hooklying Clamshell RTB 3x10  Hooklying Hip Ab RTB 15x10s  Hooklying Ball Sqz 15x10s  Supine marches RTB 20x   Clamshell 3x10 no resistance   Reverse Clamshell 3x10  SLR flex/abd 3x10  STS hi-lo 10x (hi only)  Step ups F/L 6\" x15    NT---Resume as able  Hooklying Hip Ab RTB 20x  SKC stretch L 5x30s  SL Hip AB/AD 2x10 ea  LTR 20x5\" (no hold today)  LAQ 2# 3x10 (no wt today)  Seated R hip flexion ISO 10x10s to have Left hip stabilize.  Step overs 6\" x20  HR/TR 2x10  Stand hip 3-way RTB x20  Tapdowns 4\" x20  QS 15x10s  GS 15x10s    Assessment:  Pt able to tolerate tx session well this date w/ no adverse effects noted from tx.  Pt compliant w/ program and appears to understand rationale for therapy.  Still requires skilled therapy for increasing strength/ROM for performing ADLs.      Plan:  D/t noted impairments and dysfunction of  L hip, PT will cont to address deficits of strength and ROM via therapeutic exs and modalities PRN and further assist w/ pain reduction.                   OP EDUCATION:       Goals:     "

## 2024-10-01 ENCOUNTER — TREATMENT (OUTPATIENT)
Dept: PHYSICAL THERAPY | Facility: CLINIC | Age: 64
End: 2024-10-01
Payer: MEDICAID

## 2024-10-01 DIAGNOSIS — M25.552 PAIN OF LEFT HIP: ICD-10-CM

## 2024-10-01 PROCEDURE — 97110 THERAPEUTIC EXERCISES: CPT | Mod: GP | Performed by: PHYSICAL THERAPIST

## 2024-10-01 ASSESSMENT — PAIN - FUNCTIONAL ASSESSMENT: PAIN_FUNCTIONAL_ASSESSMENT: 0-10

## 2024-10-01 ASSESSMENT — PAIN SCALES - GENERAL: PAINLEVEL_OUTOF10: 0 - NO PAIN

## 2024-10-07 ENCOUNTER — TREATMENT (OUTPATIENT)
Dept: PHYSICAL THERAPY | Facility: CLINIC | Age: 64
End: 2024-10-07
Payer: MEDICAID

## 2024-10-07 DIAGNOSIS — M25.552 PAIN OF LEFT HIP: ICD-10-CM

## 2024-10-07 PROCEDURE — 97110 THERAPEUTIC EXERCISES: CPT | Mod: GP,CQ

## 2024-10-07 NOTE — PROGRESS NOTES
"Physical Therapy Treatment    Patient Name: Grecia Stephenson  MRN: [id number]  Today's Date: 10/7/2024  Time Calculation  Start Time: 1445  Stop Time: 1525  Time Calculation (min): 40 min  PT Therapeutic Procedures Time Entry  Therapeutic Exercise Time Entry: 38         Insurance:   COPAY 0 DED 0 COVERAGE 100 OOP 0   Visit number: 9 of 16 (new POC added  8 more visits-next visit is 1 of 8)  Authorization info: 30V THEN AUTH REQ  Insurance Type: [employer]    Current Problem  1. Pain of left hip  Follow Up In Physical Therapy          Subjective   General   Pt. Reports she might have done something to her tibia since it has been sore for the past day or so.   Precautions     Pain       Objective   Treatments:   FRIEDA 6'  Bridges  RTB 2\" 3x10  Hooklying Clamshell RTB 3\" 2x10  Hooklying Ball Sqz 5\"x20  Supine marches RTB 20x   Clamshell 2x10 RTB   Reverse Clamshell 3x10  SLR flex/abd 3x10  STS hi-lo w/ staggered stance x15  Stand hip 3-way RTB x10  Step ups F/L 6\" x20  Step overs 6\" x20    Assessment:   Pt. Progressing w/ exercises well. Pt. Able to perform Step overs this date w/o UE support, demonstrating an increase in strength. Added staggered stance to STS for increasing strength w/ good pt. Tolerance. Pt. Will continue w/ current HEP.     Plan:   Continue to increase strength/endurance.     OP EDUCATION:       Goals:     "

## 2024-10-09 ENCOUNTER — TREATMENT (OUTPATIENT)
Dept: PHYSICAL THERAPY | Facility: CLINIC | Age: 64
End: 2024-10-09
Payer: MEDICAID

## 2024-10-09 DIAGNOSIS — M25.552 PAIN OF LEFT HIP: ICD-10-CM

## 2024-10-09 PROCEDURE — 97110 THERAPEUTIC EXERCISES: CPT | Mod: GP,CQ

## 2024-10-09 NOTE — PROGRESS NOTES
"Physical Therapy Treatment    Patient Name: Grecia Stephenson  MRN: [id number]  Today's Date: 10/9/2024  Time Calculation  Start Time: 1315  Stop Time: 1355  Time Calculation (min): 40 min  PT Therapeutic Procedures Time Entry  Therapeutic Exercise Time Entry: 38       Insurance:   COPAY 0 DED 0 COVERAGE 100 OOP 0   Visit number: 10 of 16  Authorization info: 30V THEN AUTH REQ  Insurance Type: [employer]    Current Problem  1. Pain of left hip  Follow Up In Physical Therapy          Subjective   General   Pt. Reports she still has tightness and her endurance is not where she would like it yet.   Precautions     Pain       Objective   Treatments:    FRIEDA 6'  Bridges  GTB 2\" 3x10  Hooklying Clamshell GTB 5\" 2x10  Hooklying Ball Sqz 5\"x20  Supine marches GTB 20x   Clamshell 2x10 GTB   Reverse Clamshell 2x10  SLR flex/abd 3x10/2x10  STS hi-lo w/ staggered stance x15  Stand hip 3-way GTB x10  Step ups F/L 8\" x20  Step overs 6\" x20  Tapdowns 6\" 2x10  SLS 10\"x10    Assessment:   Pt. Still having increased fatigue w/ staggered stance STS d/t weakness. Added SLS for improving balance and endurance w/ increased difficulty. Pt. Will try SLS towards the beginning of treatment at her [facility] to see if there is a difference. Pt. Will continue w/ current HEP.     Plan:   Continue to increase strength/balance for performing ADLs.     OP EDUCATION:       Goals:     "

## 2024-10-10 NOTE — PROGRESS NOTES
"Physical Therapy Treatment    Patient Name: Grecia Stephenson  MRN: [id number]  Today's Date: 10/14/2024  Time Calculation  Start Time: 0151  Stop Time: 0230  Time Calculation (min): 39 min  PT Therapeutic Procedures Time Entry  Therapeutic Exercise Time Entry: 39       Insurance:   COPAY 0 DED 0 COVERAGE 100 OOP 0   Visit number: 11 of 16  Authorization info: 30V THEN AUTH REQ  Insurance Type: [employer]    Current Problem  1. Pain of left hip  Follow Up In Physical Therapy            Subjective   General  General Comment: Nancy is back to work and feels tight after about 3-3.5 hrs.  Precautions  Precautions  Precautions Comment: Per MR  Pain  Pain Assessment: 0-10  0-10 (Numeric) Pain Score: 0 - No pain  Pain Location: Hip  Pain Orientation: Left    Objective   Treatments:    FRIEDA 6'  Bridges  RTB 3x10  Hooklying Clamshell RTB 10x10\"  Hooklying Ball Sqz 5\"x20---  Supine marches RTB 20x   Clamshell 10x10\" RTB  Reverse Clamshell 2x10---  SLR flex/abd 3x10/2x10---  STS hi-lo w/ staggered stance x10  Stand hip 3-way GTB x10---  Step ups F/L 6\" x20  Step overs 6\" x20---  Tapdowns 6\" 2x10---  SLS 10\"x10---  Prone SLR 3x10 BL  Lunge 2x10  Seated Ham stretch 5x20\"      Assessment:  Pt is doing well.  Pt able to tolerate tx session well this date w/ no adverse effects noted from tx.  Pt compliant w/ program and appears to understand rationale for therapy.  Still requires skilled therapy for increasing strength/ROM for performing ADLs.      Plan:  D/t noted impairments and dysfunction of  L hip, PT will cont to address deficits of strength and ROM via therapeutic exs and modalities PRN and further assist w/ pain reduction.      OP EDUCATION:       Goals:     "

## 2024-10-14 ENCOUNTER — TREATMENT (OUTPATIENT)
Dept: PHYSICAL THERAPY | Facility: CLINIC | Age: 64
End: 2024-10-14
Payer: MEDICAID

## 2024-10-14 DIAGNOSIS — M25.552 PAIN OF LEFT HIP: ICD-10-CM

## 2024-10-14 PROCEDURE — 97110 THERAPEUTIC EXERCISES: CPT | Mod: GP | Performed by: PHYSICAL THERAPIST

## 2024-10-14 ASSESSMENT — PAIN SCALES - GENERAL: PAINLEVEL_OUTOF10: 0 - NO PAIN

## 2024-10-14 ASSESSMENT — PAIN - FUNCTIONAL ASSESSMENT: PAIN_FUNCTIONAL_ASSESSMENT: 0-10

## 2024-10-16 ENCOUNTER — APPOINTMENT (OUTPATIENT)
Dept: PHYSICAL THERAPY | Facility: CLINIC | Age: 64
End: 2024-10-16
Payer: MEDICAID

## 2024-10-16 DIAGNOSIS — M25.552 PAIN OF LEFT HIP: Primary | ICD-10-CM

## 2024-10-18 NOTE — PROGRESS NOTES
"Physical Therapy Treatment    Patient Name: Grecia Stephenson  MRN: [id number]  Today's Date: 10/21/2024  Time Calculation  Start Time: 0148  Stop Time: 0230  Time Calculation (min): 42 min  PT Therapeutic Procedures Time Entry  Therapeutic Exercise Time Entry: 42       Insurance:   COPAY 0 DED 0 COVERAGE 100 OOP 0   Visit number: 11 of 16  Authorization info: 30V THEN AUTH REQ  Insurance Type: [employer]    Current Problem  1. Pain of left hip  Follow Up In Physical Therapy              Subjective   General   Pt is back to work and feels pain still.  Pt notes having back issues that have not been evaluated by a physician.  Precautions     Pain       Objective   Discussed frequency of exs to 3-4x/wk.  Also, discussed potential causes of same and referred pt to spine MD.  Treatments:    FRIEDA 6'---  Bridges  RTB 3x10---  Hooklying Clamshell RTB 10x10\"---  Hooklying Ball Sqz 5\"x20---  Supine marches YTB 20x   Clamshell 10x10\" RTB---  Reverse Clamshell 2x10---  SLR flex/abd 3x10/2x10---  STS hi-lo w/ staggered stance x10---  Stand hip 3-way GTB x10---  Step ups F/L 6\" x20  Step overs 6\" x20---  Tapdowns 6\" 2x10---  SLS 10\"x10---  Prone SLR 3x10 BL  Lunge 2x10---  Seated Ham stretch 5x20\"---  Standing 3-way YTB 2x10 ea  Bridges 15# 2x10  Bridge w/ Kicks 2x10 BL    Assessment:  Pt is mostly doing well.  She is noted to have weakness of the hip extensors/flexors on the left which may be prohibiting her from performing her work duties.   Pt able to tolerate tx session well this date w/ no adverse effects noted from tx.  Pt compliant w/ program and appears to understand rationale for therapy.  Still requires skilled therapy for increasing strength/ROM for performing ADLs.      Plan:  D/t noted impairments and dysfunction of  L hip, PT will cont to address deficits of strength and ROM via therapeutic exs and modalities PRN and further assist w/ pain reduction.      OP EDUCATION:       Goals:     "

## 2024-10-21 ENCOUNTER — TREATMENT (OUTPATIENT)
Dept: PHYSICAL THERAPY | Facility: CLINIC | Age: 64
End: 2024-10-21
Payer: MEDICAID

## 2024-10-21 DIAGNOSIS — M25.552 PAIN OF LEFT HIP: ICD-10-CM

## 2024-10-21 PROCEDURE — 97110 THERAPEUTIC EXERCISES: CPT | Mod: GP | Performed by: PHYSICAL THERAPIST

## 2024-10-23 ENCOUNTER — APPOINTMENT (OUTPATIENT)
Dept: PHYSICAL THERAPY | Facility: CLINIC | Age: 64
End: 2024-10-23
Payer: MEDICAID

## 2024-10-28 ENCOUNTER — APPOINTMENT (OUTPATIENT)
Dept: PHYSICAL THERAPY | Facility: CLINIC | Age: 64
End: 2024-10-28
Payer: MEDICAID

## 2024-10-29 ENCOUNTER — APPOINTMENT (OUTPATIENT)
Dept: ORTHOPEDIC SURGERY | Facility: CLINIC | Age: 64
End: 2024-10-29
Payer: MEDICAID

## 2024-11-08 NOTE — PROGRESS NOTES
"Physical Therapy Treatment    Patient Name: Grecia Stephenson  MRN: [id number]  Today's Date: 11/8/2024             Insurance:   COPAY 0 DED 0 COVERAGE 100 OOP 0   Visit number: 11 of 16  Authorization info: 30V THEN AUTH REQ  Insurance Type: [employer]    Current Problem  No diagnosis found.          Subjective   General   Pt is back to work and feels pain still.  Pt notes having back issues that have not been evaluated by a physician.  Precautions     Pain       Objective   Discussed frequency of exs to 3-4x/wk.  Also, discussed potential causes of same and referred pt to spine MD.  Treatments:    FRIEDA 6'---  Bridges  RTB 3x10---  Hooklying Clamshell RTB 10x10\"---  Hooklying Ball Sqz 5\"x20---  Supine marches YTB 20x   Clamshell 10x10\" RTB---  Reverse Clamshell 2x10---  SLR flex/abd 3x10/2x10---  STS hi-lo w/ staggered stance x10---  Stand hip 3-way GTB x10---  Step ups F/L 6\" x20  Step overs 6\" x20---  Tapdowns 6\" 2x10---  SLS 10\"x10---  Prone SLR 3x10 BL  Lunge 2x10---  Seated Ham stretch 5x20\"---  Standing 3-way YTB 2x10 ea  Bridges 15# 2x10  Bridge w/ Kicks 2x10 BL    Assessment:  Pt is mostly doing well.  She is noted to have weakness of the hip extensors/flexors on the left which may be prohibiting her from performing her work duties.   Pt able to tolerate tx session well this date w/ no adverse effects noted from tx.  Pt compliant w/ program and appears to understand rationale for therapy.  Still requires skilled therapy for increasing strength/ROM for performing ADLs.      Plan:  D/t noted impairments and dysfunction of  L hip, PT will cont to address deficits of strength and ROM via therapeutic exs and modalities PRN and further assist w/ pain reduction.      OP EDUCATION:       Goals:     "

## 2024-11-11 ENCOUNTER — APPOINTMENT (OUTPATIENT)
Dept: PHYSICAL THERAPY | Facility: CLINIC | Age: 64
End: 2024-11-11
Payer: MEDICAID

## 2024-11-12 ENCOUNTER — OFFICE VISIT (OUTPATIENT)
Dept: ORTHOPEDIC SURGERY | Facility: CLINIC | Age: 64
End: 2024-11-12
Payer: MEDICAID

## 2024-11-12 ENCOUNTER — HOSPITAL ENCOUNTER (OUTPATIENT)
Dept: RADIOLOGY | Facility: CLINIC | Age: 64
Discharge: HOME | End: 2024-11-12
Payer: MEDICAID

## 2024-11-12 DIAGNOSIS — M16.12 PRIMARY OSTEOARTHRITIS OF LEFT HIP: ICD-10-CM

## 2024-11-12 DIAGNOSIS — M16.12 PRIMARY OSTEOARTHRITIS OF LEFT HIP: Primary | ICD-10-CM

## 2024-11-12 PROCEDURE — 99213 OFFICE O/P EST LOW 20 MIN: CPT | Performed by: ORTHOPAEDIC SURGERY

## 2024-11-12 PROCEDURE — 73502 X-RAY EXAM HIP UNI 2-3 VIEWS: CPT | Mod: LT

## 2024-11-12 PROCEDURE — 73502 X-RAY EXAM HIP UNI 2-3 VIEWS: CPT | Mod: LEFT SIDE | Performed by: ORTHOPAEDIC SURGERY

## 2024-11-12 PROCEDURE — 1036F TOBACCO NON-USER: CPT | Performed by: ORTHOPAEDIC SURGERY

## 2024-11-13 NOTE — PROGRESS NOTES
History of present illness    64-year-old female follow-up of her left total hip replacement surgery August 12 she states doing very well she has no pain or discomfort very happy with her progress.      Past medical , Surgical, Family and social history reviewed.      Physical exam  General: No acute distress and breathing comfortably.  Patient is pleasant and cooperative with the examination.    Extremity  Incision well-healed no sign infection good range of motion neurologically intact distally brisk cap refill compartments soft calf is nontender    Diagnostics      XR hip left with pelvis when performed 2 or 3 views    Result Date: 11/12/2024  Interpreted By:  Cuauhtemoc Mancia, STUDY: XR HIP LEFT WITH PELVIS WHEN PERFORMED 2 OR 3 VIEWS; 11/12/2024 3:31 pm   INDICATION: Signs/Symptoms:pain.   ACCESSION NUMBER(S): [id number]   ORDERING CLINICIAN: CUAUHTEMOC MANCIA   FINDINGS: Two views show patient status post total hip replacement in good alignment.  No signs of fracture dislocation or other bony abnormality       Signed by: Cuauhtemoc Mancia 11/12/2024 3:37 PM Dictation workstation:   [id number]       Procedure  [ none]    Assessment  Status post left total hip replacement    Treatment plan  1.  Continue work on range of motion strengthening continue to weight-bear as tolerated follow-up with me in 3 months with x-rays sooner if has increased pain or discomfort.  2. [   ]  3. [   ]  4.  All of the patient's questions were answered.    Orders Placed This Encounter    XR hip left with pelvis when performed 2 or 3 views       This note was prepared using voice recognition software.  The details of this note are correct and have been reviewed, and corrected to the best of my ability.  Some grammatical areas may persist related to the Dragon software    Cuauhtemoc Mancia MD  Senior Attending Physician  [facility]  Orthopedic [place]    [phone]

## 2024-12-10 ENCOUNTER — HOSPITAL ENCOUNTER (OUTPATIENT)
Dept: WOMENS IMAGING | Age: 64
Discharge: HOME | End: 2024-12-12
Payer: MEDICAID

## 2024-12-10 VITALS — HEIGHT: 66 IN

## 2024-12-10 DIAGNOSIS — Z12.31 ENCOUNTER FOR SCREENING MAMMOGRAM FOR BREAST CANCER: ICD-10-CM

## 2024-12-10 PROCEDURE — 77063 BREAST TOMOSYNTHESIS BI: CPT

## 2025-02-12 ENCOUNTER — OFFICE VISIT (OUTPATIENT)
Dept: ORTHOPEDIC SURGERY | Facility: CLINIC | Age: 65
End: 2025-02-12
Payer: MEDICAID

## 2025-02-12 ENCOUNTER — HOSPITAL ENCOUNTER (OUTPATIENT)
Dept: RADIOLOGY | Facility: CLINIC | Age: 65
Discharge: HOME | End: 2025-02-12
Payer: MEDICAID

## 2025-02-12 DIAGNOSIS — M16.12 PRIMARY OSTEOARTHRITIS OF LEFT HIP: Primary | ICD-10-CM

## 2025-02-12 DIAGNOSIS — M25.552 PAIN OF LEFT HIP: ICD-10-CM

## 2025-02-12 PROCEDURE — 73502 X-RAY EXAM HIP UNI 2-3 VIEWS: CPT | Mod: LT

## 2025-02-12 PROCEDURE — 99213 OFFICE O/P EST LOW 20 MIN: CPT | Performed by: ORTHOPAEDIC SURGERY

## 2025-02-12 PROCEDURE — 1036F TOBACCO NON-USER: CPT | Performed by: ORTHOPAEDIC SURGERY

## 2025-02-14 NOTE — PROGRESS NOTES
History of present illness    64-year-old female here for follow-up 6 months out left total hip replacement states her hip is doing very well she is very happy with the progress she is ambulating without assist device no numbness or ting no fevers or chills back to normal activity without restriction.      Past medical , Surgical, Family and social history reviewed.      Physical exam  General: No acute distress and breathing comfortably.  Patient is pleasant and cooperative with the examination.    Extremity  Good range of motion of the left hip no pain with internal ex rotation incision well-healed brisk cap refill compartments soft calf is nontender    Diagnostics      XR hip left with pelvis when performed 2 or 3 views    Result Date: 2/12/2025  Interpreted By:  Cuauhtemoc Mancia, STUDY: XR HIP LEFT WITH PELVIS WHEN PERFORMED 2 OR 3 VIEWS; 2/12/2025 3:01 pm   INDICATION: Signs/Symptoms:pain.   ACCESSION NUMBER(S): [id number]   ORDERING CLINICIAN: CUAUHTEMOC MANCIA   FINDINGS: Two views show patient status post total hip replacement in good alignment.  No signs of fracture dislocation or other bony abnormality       Signed by: Cuauhtemoc Mancia 2/12/2025 3:57 PM Dictation workstation:   [id number]       Procedure  [ none]    Assessment  Status post total hip replacement left    Treatment plan  1.  Continue working on range of motion strengthening continue to weight-bear as tolerated I let her know that typically we keep an eye on hip replacement for a whole year she is to set up a 6-month appointment sooner if she has increased pain or discomfort.  2. [   ]  3. [   ]  4.  All of the patient's questions were answered.    Orders Placed This Encounter    XR hip left with pelvis when performed 2 or 3 views       This note was prepared using voice recognition software.  The details of this note are correct and have been reviewed, and corrected to the best of my ability.  Some grammatical areas  may persist related to the Dragon software    Steven Mancia MD  Senior Attending Physician  [facility]  Orthopedic [place]    [phone]

## (undated) DEVICE — DRAPE KIT, RIO

## (undated) DEVICE — Device

## (undated) DEVICE — MANIFOLD, 4 PORT NEPTUNE STANDARD

## (undated) DEVICE — PROTECTOR, NERVE, ULNAR, PINK

## (undated) DEVICE — TRACKING KIT, HIP PROCEDURES, VIZADISC

## (undated) DEVICE — BATH BLANKET STERILE

## (undated) DEVICE — SUTURE, ETHILON, 4-0, BLK, MONO, PS-2 18

## (undated) DEVICE — SUTURE, ETHIBOND, P2, V-37, 30 IN, GREEN

## (undated) DEVICE — CHECKPOINT, 3.5 HEX

## (undated) DEVICE — POSITIONER, CRADLE, HEAD, MEDC, FOAM SLOTTED

## (undated) DEVICE — PREP, IODOPHOR, W/ALCOHOL, DURAPREP, W/APPLICATOR, 26 CC

## (undated) DEVICE — NEEDLE, SPINAL, QUINCKE, 18 G X 3.5 IN, PINK HUB

## (undated) DEVICE — DRESSING, MEPILEX BORDER, POST-OP AG, 4 X 6 IN

## (undated) DEVICE — HOOD, SURGICAL, FLYTE, T7

## (undated) DEVICE — TRAY, MINOR, SINGLE BASIN, STERILE

## (undated) DEVICE — DRAPE, INCISE, ANTIMICROBIAL, IOBAN 2, LARGE, 17 X 23 IN, DISPOSABLE, STERILE

## (undated) DEVICE — SYRINGE, 50 CC, LUER LOCK

## (undated) DEVICE — TUBING, IRRIGATION, HIGH FLOW, HAND PIECE SET

## (undated) DEVICE — COVER, MAYO STAND, W/PAD, 23 IN, DISPOSABLE, PLASTIC, LF, STERILE

## (undated) DEVICE — RETRIEVER, SUTURE

## (undated) DEVICE — PIN, BONE, 4MM X 140MM, STERILE

## (undated) DEVICE — PILLOW, ABDUCTION, LARGE, 25 X 18 X 6.25 IN

## (undated) DEVICE — SUTURE, VICRYL, 1, 36 IN, V-34, VIOLET

## (undated) DEVICE — WOUND SYSTEM, DEBRIDEMENT & CLEANING, O.R DUOPAK

## (undated) DEVICE — ADHESIVE, SKIN, LIQUIBAND EXCEED

## (undated) DEVICE — SOLUTION, INJECTION, USP, SODIUM CHLORIDE 0.9%, .9, NACL, 1000 ML, BAG

## (undated) DEVICE — SUTURE, MONOCRYL, 3-0, 36 IN, CT-1, UNDYED

## (undated) DEVICE — TOWEL PACK, STERILE, 16X24, XRAY DETECTABLE, BLUE, 4/PK

## (undated) DEVICE — BLADE, SAW, RECIPROCATING, SHORT

## (undated) DEVICE — GLOVE, SURGICAL, PROTEXIS PI , 7.5, PF, LF

## (undated) DEVICE — SUTURE, MONOCRYL, 3-0, PS-1 27IN, UNDYED

## (undated) DEVICE — GOWN, SURGICAL, ROYAL SILK, LG, STERILE

## (undated) DEVICE — DRAPE, SHEET, THREE QUARTER, FAN FOLD, 57 X 77 IN

## (undated) DEVICE — STRAP, VELCRO, BODY, 4 X 60IN, NS

## (undated) DEVICE — DRESSING, MEPILEX BORDER, POST-OP AG, 4 X 10 IN